# Patient Record
Sex: FEMALE | Race: BLACK OR AFRICAN AMERICAN | Employment: OTHER | URBAN - METROPOLITAN AREA
[De-identification: names, ages, dates, MRNs, and addresses within clinical notes are randomized per-mention and may not be internally consistent; named-entity substitution may affect disease eponyms.]

---

## 2017-02-14 ENCOUNTER — OFFICE VISIT (OUTPATIENT)
Dept: INTERNAL MEDICINE CLINIC | Age: 62
End: 2017-02-14

## 2017-02-14 VITALS
HEIGHT: 63 IN | RESPIRATION RATE: 16 BRPM | HEART RATE: 69 BPM | OXYGEN SATURATION: 98 % | BODY MASS INDEX: 25.12 KG/M2 | DIASTOLIC BLOOD PRESSURE: 78 MMHG | WEIGHT: 141.8 LBS | SYSTOLIC BLOOD PRESSURE: 120 MMHG | TEMPERATURE: 98.7 F

## 2017-02-14 DIAGNOSIS — D50.9 IRON DEFICIENCY ANEMIA, UNSPECIFIED IRON DEFICIENCY ANEMIA TYPE: ICD-10-CM

## 2017-02-14 DIAGNOSIS — E11.9 TYPE 2 DIABETES MELLITUS WITHOUT COMPLICATION, WITHOUT LONG-TERM CURRENT USE OF INSULIN (HCC): ICD-10-CM

## 2017-02-14 DIAGNOSIS — R06.02 SOB (SHORTNESS OF BREATH): ICD-10-CM

## 2017-02-14 DIAGNOSIS — R05.9 COUGH: ICD-10-CM

## 2017-02-14 DIAGNOSIS — Z00.00 ROUTINE GENERAL MEDICAL EXAMINATION AT A HEALTH CARE FACILITY: Primary | ICD-10-CM

## 2017-02-14 DIAGNOSIS — Z11.59 NEED FOR HEPATITIS C SCREENING TEST: ICD-10-CM

## 2017-02-14 NOTE — PROGRESS NOTES
Subjective:   64 y.o. female for Well Woman Check. Her gyne and breast care is done elsewhere by her Ob-Gyne physician. UTD with pap in July 2016 with Dr. Shaji Stewart. Patient Active Problem List    Diagnosis Date Noted    Type 2 diabetes mellitus without complication ([facility] 47/56/4874    Migraine headache 12/14/2012    PUD (peptic ulcer disease) 09/21/2012    Diabetes mellitus ([facility] 03/28/2012    Allergic rhinitis 03/28/2012     Current Outpatient Prescriptions   Medication Sig Dispense Refill    rizatriptan (MAXALT) 10 mg tablet Take 1 Tab by mouth once as needed. May repeat in 2 hours if needed 9 Tab 5    metFORMIN (GLUCOPHAGE) 500 mg tablet Take 2 Tabs by mouth two (2) times daily (with meals). 360 Tab 3    ACCU-CHEK KALYAN PLUS TEST STRP strip USE TO CHECK BLOOD SUGAR DAILY 100 Strip 2    fluticasone (FLONASE) 50 mcg/actuation nasal spray 2 Sprays by Both Nostrils route daily. 1 Bottle 11    Rlpckfdr-Yhxz-Fhgdbo-Hyalur Ac 270-458-13-2 mg Cap Take  by mouth.  MULTIVITAMIN PO Take  by mouth.  aspirin delayed-release 81 mg tablet Take  by mouth daily.        Allergies   Allergen Reactions    Codeine Unknown (comments)     Past Medical History   Diagnosis Date    Depression      anxiety    Diabetes ([facility]      Type II    Headache(784.0)      migraines    Hx of colonic polyps     PUD (peptic ulcer disease)      Past Surgical History   Procedure Laterality Date    Hx heent       T&A    Endoscopy, colon, diagnostic  3/4/11     (Preeti)     Family History   Problem Relation Age of Onset    Cancer Father      lung (+tob)    Coronary Artery Disease Father     Cancer Mother      vaginal/cervical    SLE Mother     Coronary Artery Disease Sister      Social History   Substance Use Topics    Smoking status: Former Smoker     Packs/day: 20.00     Types: Cigarettes    Smokeless tobacco: Never Used    Alcohol use No        DM labs through Dr. Pool Carlson - results from 1/2016 reviewed with Pt.       Specific concerns today:   Seeing Dr. Nubia Albrecht for diabetes. Last A1C was 1/2017 and was 6.4. Continues with chest pain - improved from last visit but now with a pain in upper mid chest.  Sometimes feels more like a shortness of breath. Will last for a couple of days and then resolved. No wheezing, occ cough. GERD symptoms have improved post 14 days of prilosec. Had stress ECHO in November which was normal.  Previous smoker, smoked from mid 70's to 1994, approx 1 ppd. No related to eating, mornings or exertion. .    Blood pressure has not been elevated at previous visits or other MD appointments. Review of Systems  Constitutional: negative  Eyes: negative except for contacts/glasses and utd with eye exam  Ears, nose, mouth, throat, and face: negative except for still with some pain and congestion in left ear  Respiratory: negative except for cough  Cardiovascular: negative except for chest pain  Gastrointestinal: negative except for swallowing issues - GI flet was in back of throat and not an esophagus issue, recommended ENT referral.  Baked chicken and chips cause most issues  Genitourinary:negative  Integument/breast: negative  Hematologic/lymphatic: negative  Musculoskeletal:negative except for left hip and right knee pain  Neurological: negative except for headaches unchanged. Behavioral/Psych: negative  Endocrine: negative  Allergic/Immunologic: negative    Objective:   Blood pressure 120/78, pulse 69, temperature 98.7 °F (37.1 °C), temperature source Oral, resp. rate 16, height 5' 3\" (1.6 m), weight 141 lb 12.8 oz (64.3 kg), SpO2 98 %.    Physical Examination:   General appearance - alert, well appearing, and in no distress and oriented to person, place, and time  Mental status - alert, oriented to person, place, and time, normal mood, behavior, speech, dress, motor activity, and thought processes  Eyes - pupils equal and reactive, extraocular eye movements intact, funduscopic exam normal, discs flat and sharp  Ears - bilateral TM's and external ear canals normal  Nose - normal and patent, no erythema, discharge or polyps  Mouth - mucous membranes moist, pharynx normal without lesions  Neck - supple, no significant adenopathy, carotids upstroke normal bilaterally, no bruits, thyroid exam: thyroid is normal in size without nodules or tenderness  Lymphatics - no palpable lymphadenopathy, no hepatosplenomegaly  Chest - clear to auscultation, no wheezes, rales or rhonchi, symmetric air entry  Heart - normal rate, regular rhythm, normal S1, S2, no murmurs, rubs, clicks or gallops  Abdomen - soft, nontender, nondistended, no masses or organomegaly  bowel sounds normal  Breasts - breasts appear normal, no suspicious masses, no skin or nipple changes or axillary nodes  Back exam - full range of motion, no tenderness, palpable spasm or pain on motion  Neurological - alert, oriented, normal speech, no focal findings or movement disorder noted  Musculoskeletal - no joint tenderness, deformity or swelling  Extremities - peripheral pulses normal, no pedal edema, no clubbing or cyanosis  Skin - normal coloration and turgor, no rashes, no suspicious skin lesions noted     Assessment/Plan:   63yo female  DM - controlled, cont same. Foot exam complete today  Hx anemia - last blood donation borderline iron levels for eligability. Will repeat   Left chest pain - nonscardia in origin. Check CBC and pft's. HM- UTD in screeening tests.       call if any problems  Orders Placed This Encounter    XR CHEST PA LAT    HEPATITIS C AB    CBC W/O DIFF    IRON PROFILE     Follow-up Disposition: Not on File

## 2017-02-14 NOTE — MR AVS SNAPSHOT
Visit Information Date & Time Provider Department Dept. Phone Encounter #  
 2/14/2017 10:40 AM Angela Pandya MD [facility] [phone] [id number] Upcoming Health Maintenance Date Due Hepatitis C Screening 1955 EYE EXAM RETINAL OR DILATED Q1 11/4/2015 FOOT EXAM Q1 7/20/2016 HEMOGLOBIN A1C Q6M 10/22/2016 PAP AKA CERVICAL CYTOLOGY 1/1/2017 MICROALBUMIN Q1 9/15/2017 LIPID PANEL Q1 9/15/2017 DTaP/Tdap/Td series (2 - Td) 11/5/2018 BREAST CANCER SCRN MAMMOGRAM 12/14/2018 COLONOSCOPY 7/8/2021 Allergies as of 2/14/2017  Review Complete On: 2/14/2017 By: Angela Pandya MD  
  
 Severity Noted Reaction Type Reactions Codeine  12/19/2011    Unknown (comments) Current Immunizations  Reviewed on 2/14/2017 Name Date Influenza Vaccine 11/1/2016, 11/5/2014, 11/29/2013 Influenza Vaccine Whole 10/24/2011, 11/1/2008 Pneumococcal Vaccine (Unspecified Type) 8/17/2006 TDAP Vaccine 11/5/2008 Zoster Vaccine, Live 5/13/2016 Reviewed by Angela Pandya MD on 2/14/2017 at 11:39 AM  
You Were Diagnosed With   
  
 Codes Comments Routine general medical examination at a health care facility    -  Primary ICD-10-CM: Z00.00 ICD-9-CM: V70.0 Need for hepatitis C screening test     ICD-10-CM: Z11.59 
ICD-9-CM: V73.89   
 SOB (shortness of breath)     ICD-10-CM: R06.02 
ICD-9-CM: 786.05 Cough     ICD-10-CM: R05 ICD-9-CM: 168. 2 Type 2 diabetes mellitus without complication, without long-term current use of insulin (HCC)     ICD-10-CM: E11.9 ICD-9-CM: 250.00 Iron deficiency anemia, unspecified iron deficiency anemia type     ICD-10-CM: D50.9 ICD-9-CM: 280.9 Vitals BP Pulse Temp Resp Height(growth percentile) Weight(growth percentile) 120/78 69 98.7 °F (37.1 °C) (Oral) 16 5' 3\" (1.6 m) 141 lb 12.8 oz (64.3 kg) SpO2 BMI OB Status Smoking Status 98% 25.12 kg/m2 Postmenopausal Former Smoker Vitals History BMI and BSA Data Body Mass Index Body Surface Area  
 25.12 kg/m 2 1.69 m 2 Preferred Pharmacy Pharmacy Name Phone [facility] [address] , [address] [place] [address] [phone] Your Updated Medication List  
  
   
This list is accurate as of: 2/14/17 12:04 PM.  Always use your most recent med list.  
  
  
  
  
 ACCU-CHEK KALYAN PLUS TEST STRP strip Generic drug:  glucose blood VI test strips USE TO CHECK BLOOD SUGAR DAILY  
  
 aspirin delayed-release 81 mg tablet Take  by mouth daily. fluticasone 50 mcg/actuation nasal spray Commonly known as:  Caffie Euler 2 Sprays by Both Nostrils route daily. Nplejzsh-Yqem-Mdvjsw-Hyalur Ac 600-690-67-2 mg Cap Take  by mouth.  
  
 metFORMIN 500 mg tablet Commonly known as:  GLUCOPHAGE Take 2 Tabs by mouth two (2) times daily (with meals). MULTIVITAMIN PO Take  by mouth.  
  
 rizatriptan 10 mg tablet Commonly known as:  Ok Spells Take 1 Tab by mouth once as needed. May repeat in 2 hours if needed We Performed the Following CBC W/O DIFF [93071 CPT(R)] HEPATITIS C AB [89931 CPT(R)] HM DIABETES FOOT EXAM [HM7 Custom] IRON PROFILE [id number] CPT(R)] To-Do List   
 02/14/2017 Imaging:  XR CHEST PA LAT   
  
 02/20/2017 PFT:  PULMONARY FUNCTION TEST Please provide this summary of care documentation to your next provider. Your primary care clinician is listed as ERIC LUO. If you have any questions after today's visit, please call [phone].

## 2017-02-15 LAB
ERYTHROCYTE [DISTWIDTH] IN BLOOD BY AUTOMATED COUNT: 14.4 % (ref 12.3–15.4)
HCT VFR BLD AUTO: 36 % (ref 34–46.6)
HCV AB S/CO SERPL IA: <0.1 S/CO RATIO (ref 0–0.9)
HGB BLD-MCNC: 11.6 G/DL (ref 11.1–15.9)
IRON SATN MFR SERPL: 19 % (ref 15–55)
IRON SERPL-MCNC: 65 UG/DL (ref 27–139)
MCH RBC QN AUTO: 28.5 PG (ref 26.6–33)
MCHC RBC AUTO-ENTMCNC: 32.2 G/DL (ref 31.5–35.7)
MCV RBC AUTO: 89 FL (ref 79–97)
PLATELET # BLD AUTO: 327 X10E3/UL (ref 150–379)
RBC # BLD AUTO: 4.07 X10E6/UL (ref 3.77–5.28)
TIBC SERPL-MCNC: 336 UG/DL (ref 250–450)
UIBC SERPL-MCNC: 271 UG/DL (ref 118–369)
WBC # BLD AUTO: 6.3 X10E3/UL (ref 3.4–10.8)

## 2017-03-01 ENCOUNTER — HOSPITAL ENCOUNTER (OUTPATIENT)
Dept: GENERAL RADIOLOGY | Age: 62
Discharge: HOME | End: 2017-03-01
Attending: INTERNAL MEDICINE
Payer: COMMERCIAL

## 2017-03-01 ENCOUNTER — HOSPITAL ENCOUNTER (OUTPATIENT)
Dept: PULMONOLOGY | Age: 62
Discharge: HOME | End: 2017-03-01
Attending: INTERNAL MEDICINE
Payer: COMMERCIAL

## 2017-03-01 DIAGNOSIS — R05.9 COUGH: ICD-10-CM

## 2017-03-01 DIAGNOSIS — R06.02 SOB (SHORTNESS OF BREATH): ICD-10-CM

## 2017-03-01 PROCEDURE — 71020 XR CHEST PA LAT: CPT

## 2017-03-01 PROCEDURE — 94010 BREATHING CAPACITY TEST: CPT

## 2017-03-02 NOTE — PROCEDURES
[address]   [address], [address]   PULMONARY FUNCTION       Name:  Sanam Black   MR#:  [id number]   :  1955   Account #:  [de-identified]    Date of Procedure:  2017   Date of Adm:  2017       REQUESTING PRACTITIONER: Claudia Ferguson MD     INDICATIONS: Shortness of breath. Spirometry and flow loops are normal to be normal.     IMPRESSION: Normal study.         MD KELSEY Orozco / JORGE ALBERTO   D:  2017   09:57   T:  2017   14:57   Job #:  [id number]

## 2017-06-27 ENCOUNTER — HOSPITAL ENCOUNTER (OUTPATIENT)
Dept: GENERAL RADIOLOGY | Age: 62
Discharge: HOME | End: 2017-06-27
Attending: OTOLARYNGOLOGY
Payer: COMMERCIAL

## 2017-06-27 DIAGNOSIS — K21.9 ESOPHAGEAL REFLUX: ICD-10-CM

## 2017-06-27 PROCEDURE — 74220 X-RAY XM ESOPHAGUS 1CNTRST: CPT

## 2017-12-15 ENCOUNTER — HOSPITAL ENCOUNTER (OUTPATIENT)
Dept: MAMMOGRAPHY | Age: 62
Discharge: HOME | End: 2017-12-15
Attending: INTERNAL MEDICINE
Payer: COMMERCIAL

## 2017-12-15 DIAGNOSIS — Z12.39 BREAST SCREENING: ICD-10-CM

## 2017-12-15 PROCEDURE — 77067 SCR MAMMO BI INCL CAD: CPT

## 2018-01-29 ENCOUNTER — HOSPITAL ENCOUNTER (OUTPATIENT)
Dept: MRI IMAGING | Age: 63
Discharge: HOME | End: 2018-01-29
Attending: ORTHOPAEDIC SURGERY
Payer: COMMERCIAL

## 2018-01-29 DIAGNOSIS — M25.552 LEFT HIP PAIN: ICD-10-CM

## 2018-01-29 PROCEDURE — 73721 MRI JNT OF LWR EXTRE W/O DYE: CPT

## 2018-03-16 ENCOUNTER — OFFICE VISIT (OUTPATIENT)
Dept: INTERNAL MEDICINE CLINIC | Age: 63
End: 2018-03-16

## 2018-03-16 VITALS
WEIGHT: 147 LBS | SYSTOLIC BLOOD PRESSURE: 123 MMHG | HEART RATE: 80 BPM | RESPIRATION RATE: 20 BRPM | OXYGEN SATURATION: 98 % | TEMPERATURE: 98.2 F | DIASTOLIC BLOOD PRESSURE: 72 MMHG | HEIGHT: 63 IN | BODY MASS INDEX: 26.05 KG/M2

## 2018-03-16 DIAGNOSIS — E11.9 TYPE 2 DIABETES MELLITUS WITHOUT COMPLICATION, WITHOUT LONG-TERM CURRENT USE OF INSULIN (HCC): Primary | ICD-10-CM

## 2018-03-16 DIAGNOSIS — D64.9 ANEMIA, UNSPECIFIED TYPE: ICD-10-CM

## 2018-03-16 DIAGNOSIS — G43.009 MIGRAINE WITHOUT AURA AND WITHOUT STATUS MIGRAINOSUS, NOT INTRACTABLE: ICD-10-CM

## 2018-03-16 DIAGNOSIS — M16.12 PRIMARY OSTEOARTHRITIS OF LEFT HIP: ICD-10-CM

## 2018-03-16 RX ORDER — RIZATRIPTAN BENZOATE 10 MG/1
10 TABLET ORAL
Qty: 9 TAB | Refills: 5 | Status: SHIPPED | OUTPATIENT
Start: 2018-03-16 | End: 2019-08-09 | Stop reason: SDUPTHER

## 2018-03-16 RX ORDER — DICLOFENAC SODIUM 75 MG/1
TABLET, DELAYED RELEASE ORAL
COMMUNITY
Start: 2018-02-02 | End: 2018-03-22

## 2018-03-16 RX ORDER — METFORMIN HYDROCHLORIDE 500 MG/1
1000 TABLET, EXTENDED RELEASE ORAL 2 TIMES DAILY
COMMUNITY
Start: 2018-01-02 | End: 2018-04-24 | Stop reason: SDUPTHER

## 2018-03-16 NOTE — MR AVS SNAPSHOT
[address] [place] [address] [address] [facility] 
[phone] Patient: Dania Little MRN: [id number] MTS:[birth year] Visit Information Date & Time Provider Department Dept. Phone Encounter #  
 3/16/2018 11:20 AM Sonu Olguin MD [facility] [phone] [id number] Your Appointments 6/26/2018 10:40 AM  
COMPLETE 40 with Sonu Olguin MD  
[facility] Appt Note: [address] [facility] [address]  
[facility] [address] [address] [address] [address] Upcoming Health Maintenance Date Due  
 PAP AKA CERVICAL CYTOLOGY 1/1/2017 HEMOGLOBIN A1C Q6M 7/7/2017 MICROALBUMIN Q1 9/15/2017 LIPID PANEL Q1 9/15/2017 EYE EXAM RETINAL OR DILATED Q1 11/14/2017 FOOT EXAM Q1 2/14/2018 DTaP/Tdap/Td series (2 - Td) 11/5/2018 BREAST CANCER SCRN MAMMOGRAM 12/15/2019 COLONOSCOPY 7/8/2021 Allergies as of 3/16/2018  Review Complete On: 3/16/2018 By: Sonu Olguin MD  
  
 Severity Noted Reaction Type Reactions Codeine  12/19/2011    Unknown (comments) Current Immunizations  Reviewed on 2/14/2017 Name Date Influenza Vaccine 11/1/2016, 11/5/2014, 11/29/2013 Influenza Vaccine Whole 10/24/2011, 11/1/2008 TDAP Vaccine 11/5/2008 ZZZ-RETIRED (DO NOT USE) Pneumococcal Vaccine (Unspecified Type) 8/17/2006 Zoster Vaccine, Live 5/13/2016 Not reviewed this visit You Were Diagnosed With   
  
 Codes Comments Type 2 diabetes mellitus without complication, without long-term current use of insulin (HCC)    -  Primary ICD-10-CM: E11.9 ICD-9-CM: 250.00 Migraine without aura and without status migrainosus, not intractable     ICD-10-CM: C50.158 ICD-9-CM: 346.10 Anemia, unspecified type     ICD-10-CM: D64.9 ICD-9-CM: 569. 9 Vitals BP Pulse Temp Resp Height(growth percentile) Weight(growth percentile) 123/72 80 98.2 °F (36.8 °C) (Oral) 20 5' 3\" (1.6 m) 147 lb (66.7 kg) SpO2 BMI OB Status Smoking Status 98% 26.04 kg/m2 Postmenopausal Former Smoker Vitals History BMI and BSA Data Body Mass Index Body Surface Area 26.04 kg/m 2 1.72 m 2 Preferred Pharmacy Pharmacy Name Phone [place] [address] [address] [facility], [address] [place] [address] [phone] Your Updated Medication List  
  
   
This list is accurate as of 3/16/18 12:04 PM.  Always use your most recent med list.  
  
  
  
  
 ACCU-CHEK KALYAN PLUS TEST STRP strip Generic drug:  glucose blood VI test strips USE TO CHECK BLOOD SUGAR DAILY  
  
 aspirin delayed-release 81 mg tablet Take  by mouth daily. diclofenac EC 75 mg EC tablet Commonly known as:  VOLTAREN  
  
 fluticasone 50 mcg/actuation nasal spray Commonly known as:  Matthews Moots 2 Sprays by Both Nostrils route daily. Hoedfwst-Jmbw-Udpboe-Hyalur Ac 617-072-45-2 mg Cap Take  by mouth.  
  
 metFORMIN  mg tablet Commonly known as:  GLUCOPHAGE XR  
1,000 mg two (2) times a day. MULTIPLE VITAMINS 55 PLUS PO Take  by mouth.  
  
 rizatriptan 10 mg tablet Commonly known as:  Stana Adams Take 1 Tab by mouth once as needed. May repeat in 2 hours if needed Prescriptions Sent to Pharmacy Refills  
 rizatriptan (MAXALT) 10 mg tablet 5 Sig: Take 1 Tab by mouth once as needed. May repeat in 2 hours if needed Class: Normal  
 Pharmacy: [facility] [address] [facility], [address] [facility] Ph #: [phone] Route: Oral  
  
We Performed the Following CBC W/O DIFF [39122 CPT(R)] HEMOGLOBIN A1C WITH EAG [64390 CPT(R)] LIPID PANEL [24011 CPT(R)] METABOLIC PANEL, COMPREHENSIVE [60280 CPT(R)] MICROALBUMIN, UR, RAND W/ MICROALB/CREAT RATIO [id number] CPT(R)] URINALYSIS W/ RFLX MICROSCOPIC [69993 CPT(R)] To-Do List   
 2018 9:30 AM  
  Appointment with [facility] PAT CLASSROOM at [address] [facility] ([phone])  
  
 2018 11:30 AM  
  Appointment with [facility] PAT EXAM RM 2 at [address] [facility] ([phone]) Patient Instructions MyChart Activation Thank you for requesting access to [employer]. Please follow the instructions below to securely access and download your online medical record. [employer] allows you to send messages to your doctor, view your test results, renew your prescriptions, schedule appointments, and more. How Do I Sign Up? 1. In your internet browser, go to [email or web address] 
2. Click on the First Time User? Click Here link in the Sign In box. You will be redirect to the New Member Sign Up page. 3. Enter your [employer] Access Code exactly as it appears below. You will not need to use this code after youve completed the sign-up process. If you do not sign up before the expiration date, you must request a new code. [employer] Access Code: Activation code not generated Current [employer] Status: Patient Declined (This is the date your [employer] access code will ) 4. Enter the last four digits of your Social Security Number (xxxx) and Date of Birth (mm/dd/yyyy) as indicated and click Submit. You will be taken to the next sign-up page. 5. Create a [employer] ID. This will be your [employer] login ID and cannot be changed, so think of one that is secure and easy to remember. 6. Create a [employer] password. You can change your password at any time. 7. Enter your Password Reset Question and Answer. This can be used at a later time if you forget your password. 8. Enter your e-mail address. You will receive e-mail notification when new information is available in [address] 9. Click Sign Up. You can now view and download portions of your medical record. 10. Click the Download Summary menu link to download a portable copy of your medical information. Additional Information If you have questions, please visit the Frequently Asked Questions section of the [employer] website at [email or web address] Remember, [employer] is NOT to be used for urgent needs. For medical emergencies, dial 911. Please provide this summary of care documentation to your next provider. Your primary care clinician is listed as ERIC LUO. If you have any questions after today's visit, please call [phone].

## 2018-03-16 NOTE — PROGRESS NOTES
Reviewed record in preparation for visit and have obtained necessary documentation. Identified pt with two pt identifiers(name and ). Health Maintenance Due   Topic    PAP AKA CERVICAL CYTOLOGY     HEMOGLOBIN A1C Q6M     Influenza Age 5 to Adult     MICROALBUMIN Q1     LIPID PANEL Q1     EYE EXAM RETINAL OR DILATED Q1     FOOT EXAM Q1          No chief complaint on file. Wt Readings from Last 3 Encounters:   18 147 lb (66.7 kg)   17 141 lb 12.8 oz (64.3 kg)   11/10/16 141 lb (64 kg)     Temp Readings from Last 3 Encounters:   18 98.2 °F (36.8 °C) (Oral)   17 98.7 °F (37.1 °C) (Oral)   11/10/16 98.4 °F (36.9 °C) (Oral)     BP Readings from Last 3 Encounters:   17 120/78   11/10/16 130/75   16 129/75     Pulse Readings from Last 3 Encounters:   17 69   11/10/16 74   16 70           Learning Assessment:  :     Learning Assessment 3/16/2018 2015 2014   PRIMARY LEARNER Patient Patient Patient   HIGHEST LEVEL OF EDUCATION - PRIMARY LEARNER  - SOME COLLEGE SOME COLLEGE   BARRIERS PRIMARY LEARNER - NONE NONE   CO-LEARNER CAREGIVER - No No   PRIMARY LANGUAGE ENGLISH ENGLISH ENGLISH    NEED - No No   LEARNER PREFERENCE PRIMARY DEMONSTRATION READING READING   LEARNING SPECIAL TOPICS - - no   ANSWERED BY self patient patient   RELATIONSHIP SELF SELF SELF       Depression Screening:  :     PHQ over the last two weeks 3/16/2018   Little interest or pleasure in doing things Not at all   Feeling down, depressed or hopeless Not at all   Total Score PHQ 2 0       Fall Risk Assessment:  :     No flowsheet data found. Abuse Screening:  :     Abuse Screening Questionnaire 3/16/2018 2015 2014   Do you ever feel afraid of your partner? N N N   Are you in a relationship with someone who physically or mentally threatens you? N N N   Is it safe for you to go home?  Patrick Velasco       Coordination of Care Questionnaire:  :     1) Have you been to an emergency room, urgent care clinic since your last visit? no   Hospitalized since your last visit? no             2) Have you seen or consulted any other health care providers outside of [address] [place] [address] since your last visit? no  (Include any pap smears or colon screenings in this section.)    3) Do you have an Advance Directive on file? no    4) Are you interested in receiving information on Advance Directives? NO      Patient is accompanied by self I have received verbal consent from Isak Herndon to discuss any/all medical information while they are present in the room.

## 2018-03-16 NOTE — PATIENT INSTRUCTIONS
[employer] Activation    Thank you for requesting access to [employer]. Please follow the instructions below to securely access and download your online medical record. [facility] allows you to send messages to your doctor, view your test results, renew your prescriptions, schedule appointments, and more. How Do I Sign Up? 1. In your internet browser, go to [email or web address]  2. Click on the First Time User? Click Here link in the Sign In box. You will be redirect to the New Member Sign Up page. 3. Enter your [employer] Access Code exactly as it appears below. You will not need to use this code after youve completed the sign-up process. If you do not sign up before the expiration date, you must request a new code. [facility] Access Code: Activation code not generated  Current [facility] Status: Patient Declined (This is the date your [employer] access code will )    4. Enter the last four digits of your Social Security Number (xxxx) and Date of Birth (mm/dd/yyyy) as indicated and click Submit. You will be taken to the next sign-up page. 5. Create a [facility] ID. This will be your [employer] login ID and cannot be changed, so think of one that is secure and easy to remember. 6. Create a [facility] password. You can change your password at any time. 7. Enter your Password Reset Question and Answer. This can be used at a later time if you forget your password. 8. Enter your e-mail address. You will receive e-mail notification when new information is available in [address] 9. Click Sign Up. You can now view and download portions of your medical record. 10. Click the Download Summary menu link to download a portable copy of your medical information. Additional Information    If you have questions, please visit the Frequently Asked Questions section of the [employer] website at [email or web address] Remember, [employer] is NOT to be used for urgent needs. For medical emergencies, dial 911.

## 2018-03-16 NOTE — PROGRESS NOTES
Subjective:     Nisha Wong is a 58 y.o. female seen for follow up of diabetes. She also has left hip pain . Diabetic Review of Systems - medication compliance: compliant all of the time, diabetic diet compliance: compliant most of the time, home glucose monitoring: is not performed, further diabetic ROS: no polyuria or polydipsia, no chest pain, dyspnea or TIA's, no numbness, tingling or pain in extremities, no unusual visual symptoms, last eye exam approximately 4 months ago. Sees Dr. Amelia Major, last seen 2017, has follow up in . Last A1C was 6.7    Other symptoms and concerns:   Left hip replacement  at [facility] with Dr. Shakira Eden. Now with pain in right hip. Has not been exercising secondary to hip   Has tried to give blood but hgb too low. Denies BRBPR or melena. Awakening in am with headaches - mild. Either posterior or left sided. Increased allergy symptoms, icnreased stress. Migraine medication has . Current Outpatient Prescriptions   Medication Sig Dispense Refill    metFORMIN ER (GLUCOPHAGE XR) 500 mg tablet 1,000 mg two (2) times a day.  diclofenac EC (VOLTAREN) 75 mg EC tablet       MULTIVITAMIN WITH MINERALS (MULTIPLE VITAMINS 55 PLUS PO) Take  by mouth.  rizatriptan (MAXALT) 10 mg tablet Take 1 Tab by mouth once as needed. May repeat in 2 hours if needed 9 Tab 5    ACCU-CHEK KALYAN PLUS TEST STRP strip USE TO CHECK BLOOD SUGAR DAILY 100 Strip 2    Skasespq-Fbia-Rsgvts-Hyalur Ac 440-066-13-2 mg Cap Take  by mouth.  aspirin delayed-release 81 mg tablet Take  by mouth daily.  fluticasone (FLONASE) 50 mcg/actuation nasal spray 2 Sprays by Both Nostrils route daily.  1 Bottle 11        Lab Results   Component Value Date/Time    Hemoglobin A1c 7.4 (H) 2015 09:37 AM    Hemoglobin A1c 7.4 (H) 2015 08:00 AM    Hemoglobin A1c 7.0 (H) 2014 08:54 AM    Hemoglobin A1c, External 7.1 2016    Hemoglobin A1c, External 6.8 2015 Glucose 116 (H) 05/28/2015 09:37 AM    Microalb/Creat ratio (ug/mg creat.) 4.5 03/04/2015 08:00 AM    LDL, calculated 92 03/04/2015 08:00 AM    Creatinine 0.93 05/28/2015 09:37 AM      Lab Results   Component Value Date/Time    Cholesterol, total 197 03/04/2015 08:00 AM    HDL Cholesterol 86 03/04/2015 08:00 AM    LDL, calculated 92 03/04/2015 08:00 AM    LDL-C, External 88 09/11/2015    Triglyceride 96 03/04/2015 08:00 AM     Lab Results   Component Value Date/Time    ALT (SGPT) 12 03/04/2015 08:00 AM    AST (SGOT) 16 03/04/2015 08:00 AM    Alk. phosphatase 74 03/04/2015 08:00 AM    Bilirubin, total 0.3 03/04/2015 08:00 AM    Albumin 4.8 03/04/2015 08:00 AM    Protein, total 7.2 03/04/2015 08:00 AM    PLATELET 664 45/70/4933 12:17 PM       Lab Results   Component Value Date/Time    GFR est non-AA 67 05/28/2015 09:37 AM    GFR est AA 78 05/28/2015 09:37 AM    Creatinine 0.93 05/28/2015 09:37 AM    BUN 15 05/28/2015 09:37 AM    Sodium 141 05/28/2015 09:37 AM    Potassium 4.3 05/28/2015 09:37 AM    Chloride 103 05/28/2015 09:37 AM    CO2 26 05/28/2015 09:37 AM        Review of Systems  Pertinent items are noted in HPI. Objective:     Visit Vitals    /72    Pulse 80    Temp 98.2 °F (36.8 °C) (Oral)    Resp 20    Ht 5' 3\" (1.6 m)    Wt 147 lb (66.7 kg)    SpO2 98%    BMI 26.04 kg/m2     Appearance: alert, well appearing, and in no distress and oriented to person, place, and time. Exam:   NECK: supple, no lad, no bruit, no tm  LUNGS: cta bilat  CV rrr, no m/g/r  ABD: soft, nt, nd, nabs  EXT: no c/c/e    Assessment/Plan:     diabetes well controlled. Diabetic issues reviewed with her: check labs, continue diet and restart exercise after THR. Sandra Singh Continue same medication    Left hip end stage DJD - upcoming hip replacement. Hx anemia with recent low hemoglobin - repeat cbc. Headaches - some appear to be migraines, left sided. Will renew Maxalt. Some appear to be tension. Advil prn.   May improve post surgery. Anxious re upcoming surgery - pt reassured. Orders Placed This Encounter    METABOLIC PANEL, COMPREHENSIVE    LIPID PANEL    URINALYSIS W/ RFLX MICROSCOPIC    MICROALBUMIN, UR, RAND W/ MICROALB/CREAT RATIO    HEMOGLOBIN A1C WITH EAG    CBC W/O DIFF    metFORMIN ER (GLUCOPHAGE XR) 500 mg tablet    diclofenac EC (VOLTAREN) 75 mg EC tablet    MULTIVITAMIN WITH MINERALS (MULTIPLE VITAMINS 55 PLUS PO)    rizatriptan (MAXALT) 10 mg tablet     Follow-up Disposition:  Return in about 6 months (around 9/16/2018) for diabetes.

## 2018-03-17 LAB
ALBUMIN SERPL-MCNC: 4.5 G/DL (ref 3.6–4.8)
ALBUMIN/CREAT UR: 11.3 MG/G CREAT (ref 0–30)
ALBUMIN/GLOB SERPL: 1.8 {RATIO} (ref 1.2–2.2)
ALP SERPL-CCNC: 73 IU/L (ref 39–117)
ALT SERPL-CCNC: 11 IU/L (ref 0–32)
APPEARANCE UR: CLEAR
AST SERPL-CCNC: 14 IU/L (ref 0–40)
BILIRUB SERPL-MCNC: 0.5 MG/DL (ref 0–1.2)
BILIRUB UR QL STRIP: NEGATIVE
BUN SERPL-MCNC: 13 MG/DL (ref 8–27)
BUN/CREAT SERPL: 15 (ref 12–28)
CALCIUM SERPL-MCNC: 9.9 MG/DL (ref 8.7–10.3)
CHLORIDE SERPL-SCNC: 101 MMOL/L (ref 96–106)
CHOLEST SERPL-MCNC: 172 MG/DL (ref 100–199)
CO2 SERPL-SCNC: 23 MMOL/L (ref 18–29)
COLOR UR: YELLOW
CREAT SERPL-MCNC: 0.85 MG/DL (ref 0.57–1)
CREAT UR-MCNC: 95.7 MG/DL
ERYTHROCYTE [DISTWIDTH] IN BLOOD BY AUTOMATED COUNT: 14.6 % (ref 12.3–15.4)
EST. AVERAGE GLUCOSE BLD GHB EST-MCNC: 146 MG/DL
GFR SERPLBLD CREATININE-BSD FMLA CKD-EPI: 74 ML/MIN/1.73
GFR SERPLBLD CREATININE-BSD FMLA CKD-EPI: 85 ML/MIN/1.73
GLOBULIN SER CALC-MCNC: 2.5 G/DL (ref 1.5–4.5)
GLUCOSE SERPL-MCNC: 95 MG/DL (ref 65–99)
GLUCOSE UR QL: NEGATIVE
HBA1C MFR BLD: 6.7 % (ref 4.8–5.6)
HCT VFR BLD AUTO: 36.5 % (ref 34–46.6)
HDLC SERPL-MCNC: 75 MG/DL
HGB BLD-MCNC: 11.9 G/DL (ref 11.1–15.9)
HGB UR QL STRIP: NEGATIVE
INTERPRETATION, 910389: NORMAL
KETONES UR QL STRIP: NEGATIVE
LDLC SERPL CALC-MCNC: 74 MG/DL (ref 0–99)
LEUKOCYTE ESTERASE UR QL STRIP: NEGATIVE
Lab: NORMAL
MCH RBC QN AUTO: 28.6 PG (ref 26.6–33)
MCHC RBC AUTO-ENTMCNC: 32.6 G/DL (ref 31.5–35.7)
MCV RBC AUTO: 88 FL (ref 79–97)
MICRO URNS: NORMAL
MICROALBUMIN UR-MCNC: 10.8 UG/ML
NITRITE UR QL STRIP: NEGATIVE
PH UR STRIP: 5.5 [PH] (ref 5–7.5)
PLATELET # BLD AUTO: 339 X10E3/UL (ref 150–379)
POTASSIUM SERPL-SCNC: 3.9 MMOL/L (ref 3.5–5.2)
PROT SERPL-MCNC: 7 G/DL (ref 6–8.5)
PROT UR QL STRIP: NEGATIVE
RBC # BLD AUTO: 4.16 X10E6/UL (ref 3.77–5.28)
SODIUM SERPL-SCNC: 143 MMOL/L (ref 134–144)
SP GR UR: 1.02 (ref 1–1.03)
TRIGL SERPL-MCNC: 114 MG/DL (ref 0–149)
UROBILINOGEN UR STRIP-MCNC: 0.2 MG/DL (ref 0.2–1)
VLDLC SERPL CALC-MCNC: 23 MG/DL (ref 5–40)
WBC # BLD AUTO: 6.9 X10E3/UL (ref 3.4–10.8)

## 2018-03-22 ENCOUNTER — HOSPITAL ENCOUNTER (OUTPATIENT)
Dept: PREADMISSION TESTING | Age: 63
Discharge: HOME | End: 2018-03-22
Payer: COMMERCIAL

## 2018-03-22 VITALS
TEMPERATURE: 98.5 F | RESPIRATION RATE: 18 BRPM | HEART RATE: 71 BPM | OXYGEN SATURATION: 100 % | DIASTOLIC BLOOD PRESSURE: 81 MMHG | BODY MASS INDEX: 26.27 KG/M2 | HEIGHT: 63 IN | WEIGHT: 148.25 LBS | SYSTOLIC BLOOD PRESSURE: 132 MMHG

## 2018-03-22 LAB
ABO + RH BLD: NORMAL
ATRIAL RATE: 72 BPM
BLOOD GROUP ANTIBODIES SERPL: NORMAL
CALCULATED P AXIS, ECG09: 57 DEGREES
CALCULATED R AXIS, ECG10: 15 DEGREES
CALCULATED T AXIS, ECG11: 9 DEGREES
DIAGNOSIS, 93000: NORMAL
INR PPP: 0.9 (ref 0.9–1.1)
P-R INTERVAL, ECG05: 182 MS
PROTHROMBIN TIME: 9.7 SEC (ref 9–11.1)
Q-T INTERVAL, ECG07: 370 MS
QRS DURATION, ECG06: 70 MS
QTC CALCULATION (BEZET), ECG08: 405 MS
SPECIMEN EXP DATE BLD: NORMAL
VENTRICULAR RATE, ECG03: 72 BPM

## 2018-03-22 PROCEDURE — 86900 BLOOD TYPING SEROLOGIC ABO: CPT | Performed by: ORTHOPAEDIC SURGERY

## 2018-03-22 PROCEDURE — 85610 PROTHROMBIN TIME: CPT | Performed by: ORTHOPAEDIC SURGERY

## 2018-03-22 PROCEDURE — 93005 ELECTROCARDIOGRAM TRACING: CPT

## 2018-03-23 LAB
BACTERIA SPEC CULT: NORMAL
BACTERIA SPEC CULT: NORMAL
SERVICE CMNT-IMP: NORMAL

## 2018-04-13 NOTE — H&P
Kalina Dorman (MR# [id number])  : 1955        Office Visit     2/15/2018  [facility]   Lynda Gooden MD   Orthopedic Surgery   Primary osteoarthritis of left hip   Dx   Left Hip - Pain   Reason for Visit    Progress notes        PCP: ERIC Dunne MD  There are no active problems to display for this patient.        Subjective:   Chief Complaint: Pain of the Left Hip        HPI: Kalina Dorman is a 58 y.o. female who comes in today for 2nd opinion regarding some left hip and groin pain. She has been symptomatic now for about 6 months. The pain is getting progressively worse. She complains of pain in the left groin radiating into her left thigh. She states that there is a constant dull ache with intermittent severe pain. It significantly impacts her daily activities. She is no longer able to walk for exercise. She is having night pain. She has tried anti-inflammatory agents with minimal relief. She denies any radicular symptoms. She underwent an MRI scan of her left hip confirming moderate to severe osteoarthritis of the left hip with degenerative tearing of the labrum. Hip replacement was recommended.        Objective:          Vitals:     02/15/18 1509   BP: (!) 148/86   Pulse: 79      Height: 5' 3\"   Weight: 145 lb   Body mass index is 25.69 kg/m².     Constitutional:  No acute distress. Well nourished. Well developed. Eyes:  Sclera are nonicteric. Respiratory:  No labored breathing. Cardiovascular:  No marked edema. Skin:  No marked skin ulcers. Neurological:  No marked sensory loss noted. Psychiatric: Alert and oriented x3. Musculoskeletal :  She has decreased range of motion of the left hip with pain on motion. Motion of the left hip reproduces her symptoms. She is nontender over the trochanteric bursa. She has normal range of motion of the right hip. She has negative straight leg raise. She has no apparent atrophy.   She has normal range of motion of bilateral knees without crepitus or effusion. She has a strong pedal pulse. She has no pedal edema. Her leg lengths are equal. Her skin is healthy and intact.        Radiographs:       X-ray Hip Left 2+ Views     Result Date: 2/15/2018  AP Pelvis, Frog Lateral. Notes Impression:  AP pelvis and left lateral hip x-ray show moderate osteoarthritis of the left hip with mild degenerative changes of the right hip.        Assessment:      1. Primary osteoarthritis of left hip          Plan:   I reviewed her plain x-rays as well as her MRI scan which is on the iCAD system. Her plain x-rays show moderate arthritis. The MRI scan confirms focal areas of bone on bone arthritis. We spent a long time discussing treatment options. I explained that I did not have much conservative management to offer her. I am not confident that physical therapy would provide benefit for her. We discussed the option of x-ray guided steroid injections to the left hip however I am not confident this will provide long-lasting relief for her. I discussed hip replacement surgery at length with her including expected outcomes and postop recovery as well as risks and complications specifically DVT, PE, infection, dislocation, leg length discrepancy, nerve injury. After much discussion she would like to think about this. She will let us know if and when she would like to proceed.         Orders Placed This Encounter    X-ray hip left 2+ views      Return if symptoms worsen or fail to improve.      Ene Camacho MD   2/15/2018  6:33 PM      Electronically signed by Ene Camacho MD at 2/15/2018  6:37 PM    Instructions         Return if symptoms worsen or fail to improve.    Additional Documentation     Vitals:    BP  148/86    Pulse 79    Ht 5' 3\"    Wt 145 lb    BMI 25.69 kg/m²    BSA 1.71 m²         More Vitals    Flowsheets:    Custom Formula Data       Encounter Info:    Billing Info,    History,    Allergies,    Detailed Report       Communications OV PCP/Referring Notes sent to Prema Mas MD   Routing History     Recipient Method Sent by Shelton Haddad MD Fax Drew Serrano MD 2/15/2018   Fax: [phone]  Phone: [phone]    Recipient     Recipient Method Sent by Shelton Haddad MD Fax Drew Serrano MD 2/15/2018   Fax: [phone]  Phone: [phone]    Orders Placed         X-ray hip left 2+ views   Daily Orders   Comment  Hide   (720h ago through future)      Last edited by  on  at    Start   Ordered   02/15/18 0000  X-ray hip left 2+ views   Comments: Specimen [id number]: B3     02/15/18 1522   Medication Changes        None      Medication List   Visit Diagnoses         Primary osteoarthritis of left hip      Problem List     Date of Surgery Update:  Preston Vargas was seen and examined. Past Medical History:   Diagnosis Date    Depression     anxiety    Diabetes ([address]     Type II    GERD (gastroesophageal reflux disease)     Headache(784.0)     migraines    Hx of colonic polyps     Menopause     PUD (peptic ulcer disease)      Prior to Admission Medications   Prescriptions Last Dose Informant Patient Reported? Taking? ACCU-CHEK KALYAN PLUS TEST STRP strip 4/15/2018 at Unknown time  No Yes   Sig: USE TO CHECK BLOOD SUGAR DAILY   Gcibaxdh-Pgvt-Nvnvrb-Hyalur Ac 957-731-21-2 mg Cap 2018 at Unknown time  Yes Yes   Sig: Take  by mouth. MULTIVITAMIN WITH MINERALS (MULTIPLE VITAMINS 55 PLUS PO) 2018 at Unknown time  Yes Yes   Sig: Take  by mouth. aspirin delayed-release 81 mg tablet 2018 at Unknown time  Yes Yes   Sig: Take  by mouth daily. fluticasone (FLONASE) 50 mcg/actuation nasal spray 2018 at Unknown time  No Yes   Si Sprays by Both Nostrils route daily. Patient taking differently: 1 Spray by Both Nostrils route daily. metFORMIN ER (GLUCOPHAGE XR) 500 mg tablet 4/15/2018 at Unknown time  Yes Yes   Si,000 mg two (2) times a day.    rizatriptan (MAXALT) 10 mg tablet 2018 at Unknown time  No Yes   Sig: Take 1 Tab by mouth once as needed. May repeat in 2 hours if needed      Facility-Administered Medications: None      Allergy to:Codeine  Physical Examination: General appearance - alert, well appearing, and in no distress  Mental status - alert, oriented to person, place, and time  Chest - clear to auscultation  Heart - normal rate and regular rhythm  Neurological - alert, oriented, normal speech  Extremities - intact peripheral pulses, painful/limited ROM left hip  Skin - normal   History and physical has been reviewed. The patient has been examined.  There have been no significant clinical changes since the completion of the originally dated History and Physical.    Signed By: MINDY Fish     April 16, 2018 10:45 AM

## 2018-04-16 ENCOUNTER — HOSPITAL ENCOUNTER (INPATIENT)
Age: 63
LOS: 1 days | Discharge: HOME | DRG: 470 | End: 2018-04-17
Attending: ORTHOPAEDIC SURGERY | Admitting: ORTHOPAEDIC SURGERY
Payer: COMMERCIAL

## 2018-04-16 ENCOUNTER — ANESTHESIA EVENT (OUTPATIENT)
Dept: SURGERY | Age: 63
DRG: 470 | End: 2018-04-16
Payer: COMMERCIAL

## 2018-04-16 ENCOUNTER — ANESTHESIA (OUTPATIENT)
Dept: SURGERY | Age: 63
DRG: 470 | End: 2018-04-16
Payer: COMMERCIAL

## 2018-04-16 ENCOUNTER — APPOINTMENT (OUTPATIENT)
Dept: GENERAL RADIOLOGY | Age: 63
DRG: 470 | End: 2018-04-16
Attending: ORTHOPAEDIC SURGERY
Payer: COMMERCIAL

## 2018-04-16 DIAGNOSIS — M16.12 PRIMARY OSTEOARTHRITIS OF LEFT HIP: Primary | ICD-10-CM

## 2018-04-16 LAB
ABO + RH BLD: NORMAL
BLOOD GROUP ANTIBODIES SERPL: NORMAL
GLUCOSE BLD STRIP.AUTO-MCNC: 114 MG/DL (ref 65–100)
GLUCOSE BLD STRIP.AUTO-MCNC: 116 MG/DL (ref 65–100)
GLUCOSE BLD STRIP.AUTO-MCNC: 254 MG/DL (ref 65–100)
GLUCOSE BLD STRIP.AUTO-MCNC: 77 MG/DL (ref 65–100)
GLUCOSE BLD STRIP.AUTO-MCNC: 88 MG/DL (ref 65–100)
SERVICE CMNT-IMP: ABNORMAL
SERVICE CMNT-IMP: NORMAL
SERVICE CMNT-IMP: NORMAL
SPECIMEN EXP DATE BLD: NORMAL

## 2018-04-16 PROCEDURE — 77030002933 HC SUT MCRYL J&J -A: Performed by: ORTHOPAEDIC SURGERY

## 2018-04-16 PROCEDURE — 77030012935 HC DRSG AQUACEL BMS -B: Performed by: ORTHOPAEDIC SURGERY

## 2018-04-16 PROCEDURE — 74011250636 HC RX REV CODE- 250/636: Performed by: ANESTHESIOLOGY

## 2018-04-16 PROCEDURE — 36415 COLL VENOUS BLD VENIPUNCTURE: CPT | Performed by: ORTHOPAEDIC SURGERY

## 2018-04-16 PROCEDURE — 77030003666 HC NDL SPINAL BD -A: Performed by: ORTHOPAEDIC SURGERY

## 2018-04-16 PROCEDURE — 77030014125 HC TY EPDRL BBMI -B: Performed by: ANESTHESIOLOGY

## 2018-04-16 PROCEDURE — 65270000029 HC RM PRIVATE

## 2018-04-16 PROCEDURE — 77030011640 HC PAD GRND REM COVD -A: Performed by: ORTHOPAEDIC SURGERY

## 2018-04-16 PROCEDURE — 97116 GAIT TRAINING THERAPY: CPT

## 2018-04-16 PROCEDURE — 77030011264 HC ELECTRD BLD EXT COVD -A: Performed by: ORTHOPAEDIC SURGERY

## 2018-04-16 PROCEDURE — 77030031139 HC SUT VCRL2 J&J -A: Performed by: ORTHOPAEDIC SURGERY

## 2018-04-16 PROCEDURE — G8979 MOBILITY GOAL STATUS: HCPCS

## 2018-04-16 PROCEDURE — 76210000006 HC OR PH I REC 0.5 TO 1 HR: Performed by: ORTHOPAEDIC SURGERY

## 2018-04-16 PROCEDURE — 74011250636 HC RX REV CODE- 250/636

## 2018-04-16 PROCEDURE — 0SRB04A REPLACEMENT OF LEFT HIP JOINT WITH CERAMIC ON POLYETHYLENE SYNTHETIC SUBSTITUTE, UNCEMENTED, OPEN APPROACH: ICD-10-PCS | Performed by: ORTHOPAEDIC SURGERY

## 2018-04-16 PROCEDURE — 74011250636 HC RX REV CODE- 250/636: Performed by: PHYSICIAN ASSISTANT

## 2018-04-16 PROCEDURE — 76010000171 HC OR TIME 2 TO 2.5 HR INTENSV-TIER 1: Performed by: ORTHOPAEDIC SURGERY

## 2018-04-16 PROCEDURE — 74011000250 HC RX REV CODE- 250: Performed by: ORTHOPAEDIC SURGERY

## 2018-04-16 PROCEDURE — 74011000250 HC RX REV CODE- 250

## 2018-04-16 PROCEDURE — 74011250636 HC RX REV CODE- 250/636: Performed by: ORTHOPAEDIC SURGERY

## 2018-04-16 PROCEDURE — 77030034850: Performed by: ORTHOPAEDIC SURGERY

## 2018-04-16 PROCEDURE — 77030006835 HC BLD SAW SAG STRY -B: Performed by: ORTHOPAEDIC SURGERY

## 2018-04-16 PROCEDURE — C1776 JOINT DEVICE (IMPLANTABLE): HCPCS | Performed by: ORTHOPAEDIC SURGERY

## 2018-04-16 PROCEDURE — 77030032490 HC SLV COMPR SCD KNE COVD -B: Performed by: ORTHOPAEDIC SURGERY

## 2018-04-16 PROCEDURE — 97161 PT EVAL LOW COMPLEX 20 MIN: CPT

## 2018-04-16 PROCEDURE — 73501 X-RAY EXAM HIP UNI 1 VIEW: CPT

## 2018-04-16 PROCEDURE — 74011000258 HC RX REV CODE- 258

## 2018-04-16 PROCEDURE — 77030018846 HC SOL IRR STRL H20 ICUM -A: Performed by: ORTHOPAEDIC SURGERY

## 2018-04-16 PROCEDURE — G8978 MOBILITY CURRENT STATUS: HCPCS

## 2018-04-16 PROCEDURE — 77030020782 HC GWN BAIR PAWS FLX 3M -B

## 2018-04-16 PROCEDURE — 76000 FLUOROSCOPY <1 HR PHYS/QHP: CPT

## 2018-04-16 PROCEDURE — 82962 GLUCOSE BLOOD TEST: CPT

## 2018-04-16 PROCEDURE — 77030010507 HC ADH SKN DERMBND J&J -B: Performed by: ORTHOPAEDIC SURGERY

## 2018-04-16 PROCEDURE — 77030035236 HC SUT PDS STRATFX BARB J&J -B: Performed by: ORTHOPAEDIC SURGERY

## 2018-04-16 PROCEDURE — 77030018836 HC SOL IRR NACL ICUM -A: Performed by: ORTHOPAEDIC SURGERY

## 2018-04-16 PROCEDURE — 86900 BLOOD TYPING SEROLOGIC ABO: CPT | Performed by: ORTHOPAEDIC SURGERY

## 2018-04-16 PROCEDURE — 77030020788: Performed by: ORTHOPAEDIC SURGERY

## 2018-04-16 PROCEDURE — 74011250637 HC RX REV CODE- 250/637: Performed by: PHYSICIAN ASSISTANT

## 2018-04-16 PROCEDURE — 76060000035 HC ANESTHESIA 2 TO 2.5 HR: Performed by: ORTHOPAEDIC SURGERY

## 2018-04-16 DEVICE — COMPONENT TOT HIP PRIMARY CERM ALTRX: Type: IMPLANTABLE DEVICE | Site: HIP | Status: FUNCTIONAL

## 2018-04-16 DEVICE — BIOLOX DELTA CERAMIC FEMORAL HEAD +1.5 36MM DIA 12/14 TAPER
Type: IMPLANTABLE DEVICE | Site: HIP | Status: FUNCTIONAL
Brand: BIOLOX DELTA

## 2018-04-16 DEVICE — SUMMIT FEMORAL STEM 12/14 TAPER TAPER ED W/POROCOAT SIZE 3 STD 135MM
Type: IMPLANTABLE DEVICE | Site: HIP | Status: FUNCTIONAL
Brand: SUMMIT POROCOAT

## 2018-04-16 DEVICE — LINER ACET OD52MM ID36MM HIP ALTRX PINN: Type: IMPLANTABLE DEVICE | Site: HIP | Status: FUNCTIONAL

## 2018-04-16 DEVICE — CUP ACET DIA52MM HIP GRIPTION PRI CEMENTLESS FIX SECT SER: Type: IMPLANTABLE DEVICE | Site: HIP | Status: FUNCTIONAL

## 2018-04-16 DEVICE — SCREW BNE L25MM DIA6.5MM CANC HIP S STL GRIPTION FULL THRD: Type: IMPLANTABLE DEVICE | Site: HIP | Status: FUNCTIONAL

## 2018-04-16 RX ORDER — MIDAZOLAM HYDROCHLORIDE 1 MG/ML
1 INJECTION, SOLUTION INTRAMUSCULAR; INTRAVENOUS AS NEEDED
Status: DISCONTINUED | OUTPATIENT
Start: 2018-04-16 | End: 2018-04-16 | Stop reason: HOSPADM

## 2018-04-16 RX ORDER — PROPOFOL 10 MG/ML
INJECTION, EMULSION INTRAVENOUS AS NEEDED
Status: DISCONTINUED | OUTPATIENT
Start: 2018-04-16 | End: 2018-04-16 | Stop reason: HOSPADM

## 2018-04-16 RX ORDER — RIZATRIPTAN BENZOATE 10 MG/1
10 TABLET ORAL
Status: DISCONTINUED | OUTPATIENT
Start: 2018-04-16 | End: 2018-04-17 | Stop reason: HOSPADM

## 2018-04-16 RX ORDER — BUPIVACAINE HYDROCHLORIDE 5 MG/ML
INJECTION, SOLUTION EPIDURAL; INTRACAUDAL AS NEEDED
Status: DISCONTINUED | OUTPATIENT
Start: 2018-04-16 | End: 2018-04-16 | Stop reason: HOSPADM

## 2018-04-16 RX ORDER — METFORMIN HYDROCHLORIDE 500 MG/1
1000 TABLET, EXTENDED RELEASE ORAL 2 TIMES DAILY
Status: DISCONTINUED | OUTPATIENT
Start: 2018-04-17 | End: 2018-04-17 | Stop reason: HOSPADM

## 2018-04-16 RX ORDER — MIDAZOLAM HYDROCHLORIDE 1 MG/ML
INJECTION, SOLUTION INTRAMUSCULAR; INTRAVENOUS AS NEEDED
Status: DISCONTINUED | OUTPATIENT
Start: 2018-04-16 | End: 2018-04-16 | Stop reason: HOSPADM

## 2018-04-16 RX ORDER — SODIUM CHLORIDE, SODIUM LACTATE, POTASSIUM CHLORIDE, CALCIUM CHLORIDE 600; 310; 30; 20 MG/100ML; MG/100ML; MG/100ML; MG/100ML
25 INJECTION, SOLUTION INTRAVENOUS CONTINUOUS
Status: DISCONTINUED | OUTPATIENT
Start: 2018-04-16 | End: 2018-04-16 | Stop reason: HOSPADM

## 2018-04-16 RX ORDER — ACETAMINOPHEN 500 MG
1000 TABLET ORAL ONCE
Status: COMPLETED | OUTPATIENT
Start: 2018-04-16 | End: 2018-04-16

## 2018-04-16 RX ORDER — CEFAZOLIN SODIUM/WATER 2 G/20 ML
2 SYRINGE (ML) INTRAVENOUS ONCE
Status: COMPLETED | OUTPATIENT
Start: 2018-04-16 | End: 2018-04-16

## 2018-04-16 RX ORDER — SODIUM CHLORIDE 0.9 % (FLUSH) 0.9 %
5-10 SYRINGE (ML) INJECTION EVERY 8 HOURS
Status: DISCONTINUED | OUTPATIENT
Start: 2018-04-17 | End: 2018-04-17 | Stop reason: HOSPADM

## 2018-04-16 RX ORDER — LIDOCAINE HYDROCHLORIDE 20 MG/ML
INJECTION, SOLUTION EPIDURAL; INFILTRATION; INTRACAUDAL; PERINEURAL AS NEEDED
Status: DISCONTINUED | OUTPATIENT
Start: 2018-04-16 | End: 2018-04-16 | Stop reason: HOSPADM

## 2018-04-16 RX ORDER — SODIUM CHLORIDE, SODIUM LACTATE, POTASSIUM CHLORIDE, CALCIUM CHLORIDE 600; 310; 30; 20 MG/100ML; MG/100ML; MG/100ML; MG/100ML
125 INJECTION, SOLUTION INTRAVENOUS CONTINUOUS
Status: DISCONTINUED | OUTPATIENT
Start: 2018-04-16 | End: 2018-04-16 | Stop reason: HOSPADM

## 2018-04-16 RX ORDER — MORPHINE SULFATE 10 MG/ML
2 INJECTION, SOLUTION INTRAMUSCULAR; INTRAVENOUS
Status: DISCONTINUED | OUTPATIENT
Start: 2018-04-16 | End: 2018-04-16 | Stop reason: HOSPADM

## 2018-04-16 RX ORDER — FLUTICASONE PROPIONATE 50 MCG
1 SPRAY, SUSPENSION (ML) NASAL DAILY
Status: DISCONTINUED | OUTPATIENT
Start: 2018-04-17 | End: 2018-04-17 | Stop reason: HOSPADM

## 2018-04-16 RX ORDER — ONDANSETRON 2 MG/ML
INJECTION INTRAMUSCULAR; INTRAVENOUS AS NEEDED
Status: DISCONTINUED | OUTPATIENT
Start: 2018-04-16 | End: 2018-04-16 | Stop reason: HOSPADM

## 2018-04-16 RX ORDER — NALOXONE HYDROCHLORIDE 0.4 MG/ML
0.4 INJECTION, SOLUTION INTRAMUSCULAR; INTRAVENOUS; SUBCUTANEOUS AS NEEDED
Status: DISCONTINUED | OUTPATIENT
Start: 2018-04-16 | End: 2018-04-17 | Stop reason: HOSPADM

## 2018-04-16 RX ORDER — FACIAL-BODY WIPES
10 EACH TOPICAL DAILY PRN
Status: DISCONTINUED | OUTPATIENT
Start: 2018-04-18 | End: 2018-04-17 | Stop reason: HOSPADM

## 2018-04-16 RX ORDER — CEFAZOLIN SODIUM/WATER 2 G/20 ML
2 SYRINGE (ML) INTRAVENOUS EVERY 8 HOURS
Status: COMPLETED | OUTPATIENT
Start: 2018-04-16 | End: 2018-04-17

## 2018-04-16 RX ORDER — FENTANYL CITRATE 50 UG/ML
25 INJECTION, SOLUTION INTRAMUSCULAR; INTRAVENOUS
Status: DISCONTINUED | OUTPATIENT
Start: 2018-04-16 | End: 2018-04-16 | Stop reason: HOSPADM

## 2018-04-16 RX ORDER — ACETAMINOPHEN 325 MG/1
650 TABLET ORAL
Status: DISCONTINUED | OUTPATIENT
Start: 2018-04-16 | End: 2018-04-17 | Stop reason: HOSPADM

## 2018-04-16 RX ORDER — ONDANSETRON 2 MG/ML
4 INJECTION INTRAMUSCULAR; INTRAVENOUS
Status: DISCONTINUED | OUTPATIENT
Start: 2018-04-16 | End: 2018-04-17 | Stop reason: HOSPADM

## 2018-04-16 RX ORDER — ONDANSETRON 2 MG/ML
4 INJECTION INTRAMUSCULAR; INTRAVENOUS AS NEEDED
Status: DISCONTINUED | OUTPATIENT
Start: 2018-04-16 | End: 2018-04-16 | Stop reason: HOSPADM

## 2018-04-16 RX ORDER — OXYCODONE HYDROCHLORIDE 5 MG/1
10 TABLET ORAL
Status: DISCONTINUED | OUTPATIENT
Start: 2018-04-16 | End: 2018-04-17 | Stop reason: HOSPADM

## 2018-04-16 RX ORDER — MIDAZOLAM HYDROCHLORIDE 1 MG/ML
0.5 INJECTION, SOLUTION INTRAMUSCULAR; INTRAVENOUS
Status: DISCONTINUED | OUTPATIENT
Start: 2018-04-16 | End: 2018-04-16 | Stop reason: HOSPADM

## 2018-04-16 RX ORDER — OXYCODONE HYDROCHLORIDE 5 MG/1
5 TABLET ORAL
Status: DISCONTINUED | OUTPATIENT
Start: 2018-04-16 | End: 2018-04-17 | Stop reason: HOSPADM

## 2018-04-16 RX ORDER — SODIUM CHLORIDE 0.9 % (FLUSH) 0.9 %
5-10 SYRINGE (ML) INJECTION AS NEEDED
Status: DISCONTINUED | OUTPATIENT
Start: 2018-04-16 | End: 2018-04-16 | Stop reason: HOSPADM

## 2018-04-16 RX ORDER — HYDROXYZINE HYDROCHLORIDE 10 MG/1
10 TABLET, FILM COATED ORAL
Status: DISCONTINUED | OUTPATIENT
Start: 2018-04-16 | End: 2018-04-17 | Stop reason: HOSPADM

## 2018-04-16 RX ORDER — PREGABALIN 75 MG/1
75 CAPSULE ORAL ONCE
Status: COMPLETED | OUTPATIENT
Start: 2018-04-16 | End: 2018-04-16

## 2018-04-16 RX ORDER — POLYETHYLENE GLYCOL 3350 17 G/17G
17 POWDER, FOR SOLUTION ORAL DAILY
Status: DISCONTINUED | OUTPATIENT
Start: 2018-04-17 | End: 2018-04-17 | Stop reason: HOSPADM

## 2018-04-16 RX ORDER — SODIUM CHLORIDE 9 MG/ML
125 INJECTION, SOLUTION INTRAVENOUS CONTINUOUS
Status: DISCONTINUED | OUTPATIENT
Start: 2018-04-16 | End: 2018-04-17 | Stop reason: HOSPADM

## 2018-04-16 RX ORDER — LIDOCAINE HYDROCHLORIDE 10 MG/ML
0.1 INJECTION, SOLUTION EPIDURAL; INFILTRATION; INTRACAUDAL; PERINEURAL AS NEEDED
Status: DISCONTINUED | OUTPATIENT
Start: 2018-04-16 | End: 2018-04-16 | Stop reason: HOSPADM

## 2018-04-16 RX ORDER — SODIUM CHLORIDE 0.9 % (FLUSH) 0.9 %
5-10 SYRINGE (ML) INJECTION AS NEEDED
Status: DISCONTINUED | OUTPATIENT
Start: 2018-04-16 | End: 2018-04-17 | Stop reason: HOSPADM

## 2018-04-16 RX ORDER — CELECOXIB 200 MG/1
200 CAPSULE ORAL 2 TIMES DAILY
Status: DISCONTINUED | OUTPATIENT
Start: 2018-04-16 | End: 2018-04-17 | Stop reason: HOSPADM

## 2018-04-16 RX ORDER — HYDROMORPHONE HYDROCHLORIDE 2 MG/ML
0.5 INJECTION, SOLUTION INTRAMUSCULAR; INTRAVENOUS; SUBCUTANEOUS
Status: DISCONTINUED | OUTPATIENT
Start: 2018-04-16 | End: 2018-04-17 | Stop reason: HOSPADM

## 2018-04-16 RX ORDER — AMOXICILLIN 250 MG
1 CAPSULE ORAL 2 TIMES DAILY
Status: DISCONTINUED | OUTPATIENT
Start: 2018-04-16 | End: 2018-04-17 | Stop reason: HOSPADM

## 2018-04-16 RX ORDER — DIPHENHYDRAMINE HYDROCHLORIDE 50 MG/ML
12.5 INJECTION, SOLUTION INTRAMUSCULAR; INTRAVENOUS AS NEEDED
Status: DISCONTINUED | OUTPATIENT
Start: 2018-04-16 | End: 2018-04-16 | Stop reason: HOSPADM

## 2018-04-16 RX ORDER — OXYCODONE HYDROCHLORIDE 5 MG/1
5 TABLET ORAL AS NEEDED
Status: DISCONTINUED | OUTPATIENT
Start: 2018-04-16 | End: 2018-04-16 | Stop reason: HOSPADM

## 2018-04-16 RX ORDER — OXYCODONE HCL 10 MG/1
10 TABLET, FILM COATED, EXTENDED RELEASE ORAL ONCE
Status: COMPLETED | OUTPATIENT
Start: 2018-04-16 | End: 2018-04-16

## 2018-04-16 RX ORDER — SODIUM CHLORIDE 0.9 % (FLUSH) 0.9 %
5-10 SYRINGE (ML) INJECTION EVERY 8 HOURS
Status: DISCONTINUED | OUTPATIENT
Start: 2018-04-16 | End: 2018-04-16 | Stop reason: HOSPADM

## 2018-04-16 RX ORDER — CELECOXIB 200 MG/1
200 CAPSULE ORAL ONCE
Status: COMPLETED | OUTPATIENT
Start: 2018-04-16 | End: 2018-04-16

## 2018-04-16 RX ORDER — BUPIVACAINE HYDROCHLORIDE AND EPINEPHRINE 5; 5 MG/ML; UG/ML
INJECTION, SOLUTION EPIDURAL; INTRACAUDAL; PERINEURAL AS NEEDED
Status: DISCONTINUED | OUTPATIENT
Start: 2018-04-16 | End: 2018-04-16 | Stop reason: HOSPADM

## 2018-04-16 RX ORDER — SODIUM CHLORIDE 9 MG/ML
25 INJECTION, SOLUTION INTRAVENOUS CONTINUOUS
Status: DISCONTINUED | OUTPATIENT
Start: 2018-04-16 | End: 2018-04-16 | Stop reason: HOSPADM

## 2018-04-16 RX ORDER — PROPOFOL 10 MG/ML
INJECTION, EMULSION INTRAVENOUS
Status: DISCONTINUED | OUTPATIENT
Start: 2018-04-16 | End: 2018-04-16 | Stop reason: HOSPADM

## 2018-04-16 RX ORDER — FENTANYL CITRATE 50 UG/ML
50 INJECTION, SOLUTION INTRAMUSCULAR; INTRAVENOUS AS NEEDED
Status: DISCONTINUED | OUTPATIENT
Start: 2018-04-16 | End: 2018-04-16 | Stop reason: HOSPADM

## 2018-04-16 RX ADMIN — LIDOCAINE HYDROCHLORIDE 20 MG: 20 INJECTION, SOLUTION EPIDURAL; INFILTRATION; INTRACAUDAL; PERINEURAL at 11:20

## 2018-04-16 RX ADMIN — Medication 2 G: at 20:41

## 2018-04-16 RX ADMIN — RIVAROXABAN 10 MG: 10 TABLET, FILM COATED ORAL at 20:40

## 2018-04-16 RX ADMIN — OXYCODONE HYDROCHLORIDE 5 MG: 5 TABLET ORAL at 18:37

## 2018-04-16 RX ADMIN — CELECOXIB 200 MG: 200 CAPSULE ORAL at 20:40

## 2018-04-16 RX ADMIN — CELECOXIB 200 MG: 200 CAPSULE ORAL at 10:56

## 2018-04-16 RX ADMIN — MIDAZOLAM HYDROCHLORIDE 1 MG: 1 INJECTION, SOLUTION INTRAMUSCULAR; INTRAVENOUS at 11:26

## 2018-04-16 RX ADMIN — ONDANSETRON 4 MG: 2 INJECTION INTRAMUSCULAR; INTRAVENOUS at 11:52

## 2018-04-16 RX ADMIN — ACETAMINOPHEN 1000 MG: 500 TABLET, FILM COATED ORAL at 10:56

## 2018-04-16 RX ADMIN — PROPOFOL 20 MG: 10 INJECTION, EMULSION INTRAVENOUS at 11:20

## 2018-04-16 RX ADMIN — BUPIVACAINE HYDROCHLORIDE 13 MG: 5 INJECTION, SOLUTION EPIDURAL; INTRACAUDAL at 11:34

## 2018-04-16 RX ADMIN — PROPOFOL 20 MG: 10 INJECTION, EMULSION INTRAVENOUS at 12:24

## 2018-04-16 RX ADMIN — PREGABALIN 75 MG: 75 CAPSULE ORAL at 10:56

## 2018-04-16 RX ADMIN — SODIUM CHLORIDE 125 ML/HR: 900 INJECTION, SOLUTION INTRAVENOUS at 14:00

## 2018-04-16 RX ADMIN — PROPOFOL 20 MG: 10 INJECTION, EMULSION INTRAVENOUS at 11:23

## 2018-04-16 RX ADMIN — MIDAZOLAM HYDROCHLORIDE 1 MG: 1 INJECTION, SOLUTION INTRAMUSCULAR; INTRAVENOUS at 11:20

## 2018-04-16 RX ADMIN — PROPOFOL 50 MCG/KG/MIN: 10 INJECTION, EMULSION INTRAVENOUS at 11:17

## 2018-04-16 RX ADMIN — Medication 2 G: at 11:36

## 2018-04-16 RX ADMIN — PROPOFOL 20 MG: 10 INJECTION, EMULSION INTRAVENOUS at 11:27

## 2018-04-16 RX ADMIN — MIDAZOLAM HYDROCHLORIDE 2 MG: 1 INJECTION, SOLUTION INTRAMUSCULAR; INTRAVENOUS at 11:13

## 2018-04-16 RX ADMIN — ACETAMINOPHEN 650 MG: 325 TABLET, FILM COATED ORAL at 18:36

## 2018-04-16 RX ADMIN — SODIUM CHLORIDE, SODIUM LACTATE, POTASSIUM CHLORIDE, AND CALCIUM CHLORIDE 25 ML/HR: 600; 310; 30; 20 INJECTION, SOLUTION INTRAVENOUS at 11:04

## 2018-04-16 RX ADMIN — STANDARDIZED SENNA CONCENTRATE AND DOCUSATE SODIUM 1 TABLET: 8.6; 5 TABLET, FILM COATED ORAL at 18:36

## 2018-04-16 RX ADMIN — OXYCODONE HYDROCHLORIDE 10 MG: 10 TABLET, FILM COATED, EXTENDED RELEASE ORAL at 10:56

## 2018-04-16 RX ADMIN — SODIUM CHLORIDE, SODIUM LACTATE, POTASSIUM CHLORIDE, AND CALCIUM CHLORIDE: 600; 310; 30; 20 INJECTION, SOLUTION INTRAVENOUS at 13:08

## 2018-04-16 RX ADMIN — SODIUM CHLORIDE 125 ML/HR: 900 INJECTION, SOLUTION INTRAVENOUS at 22:16

## 2018-04-16 RX ADMIN — ROPIVACAINE HYDROCHLORIDE: 5 INJECTION, SOLUTION EPIDURAL; INFILTRATION; PERINEURAL at 13:00

## 2018-04-16 NOTE — IP AVS SNAPSHOT
[address] [address] 
[phone] Patient: Claudell Romano MRN: [id number] PUL:[birth year] About your hospitalization You were admitted on:  April 16, 2018 You last received care in the:  [address] [facility] You were discharged on:  April 17, 2018 Why you were hospitalized Your primary diagnosis was:  Osteoarthritis Of Left Hip Follow-up Information Follow up With Details Comments Contact Info Lili Britt MD   [address] [facility] [place] [address] 
[phone] [facility] [address]  for home health skilled nursing and physical therapy. [address] [facility] [address] 
[phone] Discharge Orders None A check nick indicates which time of day the medication should be taken. My Medications START taking these medications Instructions Each Dose to Equal  
 Morning Noon Evening Bedtime  
 meloxicam 7.5 mg tablet Commonly known as:  MOBIC Your last dose was: Your next dose is: Take 1 Tab by mouth daily. 7.5 mg  
    
   
   
   
  
 oxyCODONE IR 5 mg immediate release tablet Commonly known as:  Nae Briggs Your last dose was: Your next dose is: Take 1 Tab by mouth every three (3) hours as needed. Max Daily Amount: 40 mg.  
 5 mg  
    
   
   
   
  
 rivaroxaban 10 mg tablet Commonly known as:  Wsiam Dick Your last dose was: Your next dose is: Take 1 Tab by mouth daily (with lunch). Indications: HIP SURGERY DEEP VEIN THROMBOSIS PREVENTION  
 10 mg CHANGE how you take these medications Instructions Each Dose to Equal  
 Morning Noon Evening Bedtime  
 fluticasone 50 mcg/actuation nasal spray Commonly known as:  Beti Castro What changed:  how much to take Your last dose was: Your next dose is: 2 Sprays by Both Nostrils route daily. 2 Spray CONTINUE taking these medications Instructions Each Dose to Equal  
 Morning Noon Evening Bedtime ACCU-CHEK KALYAN PLUS TEST STRP strip Generic drug:  glucose blood VI test strips Your last dose was: Your next dose is:    
   
   
 USE TO CHECK BLOOD SUGAR DAILY Ivfzxedy-Eisg-Zmgnxi-Hyalur Ac [id number] mg Cap Your last dose was: Your next dose is: Take  by mouth.  
     
   
   
   
  
 metFORMIN  mg tablet Commonly known as:  GLUCOPHAGE XR Your last dose was: Your next dose is:    
   
   
 1,000 mg two (2) times a day. 1000 mg MULTIPLE VITAMINS 55 PLUS PO Your last dose was: Your next dose is: Take  by mouth.  
     
   
   
   
  
 rizatriptan 10 mg tablet Commonly known as:  Giovanna Forward Your last dose was: Your next dose is: Take 1 Tab by mouth once as needed. May repeat in 2 hours if needed 10 mg  
    
   
   
   
  
  
STOP taking these medications   
 aspirin delayed-release 81 mg tablet Where to Get Your Medications Information on where to get these meds will be given to you by the nurse or doctor. ! Ask your nurse or doctor about these medications  
  meloxicam 7.5 mg tablet  
 oxyCODONE IR 5 mg immediate release tablet  
 rivaroxaban 10 mg tablet Opioid Education Prescription Opioids: What You Need to Know: 
 
Prescription opioids can be used to help relieve moderate-to-severe pain and are often prescribed following a surgery or injury, or for certain health conditions. These medications can be an important part of treatment but also come with serious risks. Opioids are strong pain medicines.  Examples include hydrocodone, oxycodone, fentanyl, and morphine. Heroin is an example of an illegal opioid. It is important to work with your health care provider to make sure you are getting the safest, most effective care. WHAT ARE THE RISKS AND SIDE EFFECTS OF OPIOID USE? Prescription opioids carry serious risks of addiction and overdose, especially with prolonged use. An opioid overdose, often marked by slow breathing, can cause sudden death. The use of prescription opioids can have a number of side effects as well, even when taken as directed. · Tolerance-meaning you might need to take more of a medication for the same pain relief · Physical dependence-meaning you have symptoms of withdrawal when the medication is stopped. Withdrawal symptoms can include nausea, sweating, chills, diarrhea, stomach cramps, and muscle aches. Withdrawal can last up to several weeks, depending on which drug you took and how long you took it. · Increased sensitivity to pain · Constipation · Nausea, vomiting, and dry mouth · Sleepiness and dizziness · Confusion · Depression · Low levels of testosterone that can result in lower sex drive, energy, and strength · Itching and sweating RISKS ARE GREATER WITH:      
· History of drug misuse, substance use disorder, or overdose · Mental health conditions (such as depression or anxiety) · Sleep apnea · Older age (72 years or older) · Pregnancy Avoid alcohol while taking prescription opioids. Also, unless specifically advised by your health care provider, medications to avoid include: · Benzodiazepines (such as Xanax or Valium) · Muscle relaxants (such as Soma or Flexeril) · Hypnotics (such as Ambien or Lunesta) · Other prescription opioids KNOW YOUR OPTIONS Talk to your health care provider about ways to manage your pain that don't involve prescription opioids. Some of these options may actually work better and have fewer risks and side effects. Options may include: · Pain relievers such as acetaminophen, ibuprofen, and naproxen · Some medications that are also used for depression or seizures · Physical therapy and exercise · Counseling to help patients learn how to cope better with triggers of pain and stress. · Application of heat or cold compress · Massage therapy · Relaxation techniques Be Informed Make sure you know the name of your medication, how much and how often to take it, and its potential risks & side effects. IF YOU ARE PRESCRIBED OPIOIDS FOR PAIN: 
· Never take opioids in greater amounts or more often than prescribed. Remember the goal is not to be pain-free but to manage your pain at a tolerable level. · Follow up with your primary care provider to: · Work together to create a plan on how to manage your pain. · Talk about ways to help manage your pain that don't involve prescription opioids. · Talk about any and all concerns and side effects. · Help prevent misuse and abuse. · Never sell or share prescription opioids · Help prevent misuse and abuse. · Store prescription opioids in a secure place and out of reach of others (this may include visitors, children, friends, and family). · Safely dispose of unused/unwanted prescription opioids: Find your community drug take-back program or your pharmacy mail-back program, or flush them down the toilet, following guidance from the Food and Drug Administration (www.fda.gov/Drugs/ResourcesForYou). · Visit www.cdc.gov/drugoverdose to learn about the risks of opioid abuse and overdose. · If you believe you may be struggling with addiction, tell your health care provider and ask for guidance or call [employer] at 0-713-665-PLIH. Discharge Instructions After Hospital Care Plan/Discharge Instructions Anterior Approach Hip Replacement- Dr. Josue Martinez [facility] of [facility]  4/16/2018 Procedure  Procedure(s): LEFT TOTAL HIP ARTHROPLASTY ANTERIOR APPROACH (CHOICE ANESTHESIA) Surgeon  Surgeon(s) and Role: Murlean Bernheim, MD - Primary PCP: Corry Britt MD 
Date of discharge: No discharge date for patient encounter. Follow up appointments ? Follow up with Dr. Bird calix in 2 weeks. Call [phone] to make an appointment. ? If home health has been arranged for you the agency will contact you to arrange dates/times for visits. Please call them if you do not hear from them within 24 hours after you are discharged When to call your Orthopaedic Surgeon: Call [phone]. If you call after 5pm or on a weekend, the on call physician will be contacted ? Increased hip pain, unrelieved pain or if you have difficulty or are unable to walk ? Signs of infection-if your incision is red; continues to have drainage; drainage has a foul odor or if you have a persistent fever over 101 degrees ? Signs of a blood clot in your leg-calf pain, tenderness, redness, swelling of lower leg When to call your Primary Care Physician: 
? Concerns about medical conditions such as diabetes, high blood pressure, asthma, congestive heart failure Call if blood sugars are elevated, persistent headache or dizziness, coughing or congestion, constipation or diarrhea, burning with urination, abnormal heart rate When to call 9[address]zke go to the nearest emergency room ? Acute onset of chest pain, shortness of breath, difficulty breathing Activity ? Weight bearing as tolerated with walker or crutches. Refer to pages 23-33 of your handbook for instructions and pictures ? Complete your Home Exercise Program daily as instructed by your therapist.  Refer to pages 36-42 of your handbook for instructions and pictures ? Get up every one hour and walk (except at night when sleeping) ? AVOID sudden and extreme movement of your hip (surgical leg) ? Do not drive or operate heavy machinery Incision Care ? The Aquacel (brown, waterproof) surgical dressing is to remain on your hip for 7 days. On the 7th day have someone gently peel the dressing off by carefully lifting the edge and stretching it slightly to break the adhesive seal 
? You may now leave your incision open to air. You will have absorbable sutures in your incision ? If your Aquacel dressing comes loose/off before the 7th day, you may replace it with a dry sterile gauze dressing; change it daily. Once your incision is not draining, you may leave it open to air ? You may take a shower with the Aquacel dressing in place. Once the Aquacel is removed, you may shower and get your incision wet but do not submerge your incision under water in a bath tub, hot tub or swimming pool for 6 weeks after surgery. Preventing blood clots Take Xarelto at 12 noon daily as prescribed by Dr. Francine Azar Pain management ? Take pain medication as prescribed; decrease the amount you use as your pain lessens ? Avoid alcoholic beverages while taking pain medication ? Please be aware that many medications contain Tylenol. We do not want you to over medicate so please read the information below as a guide. Do not take more than 4 Grams of Tylenol in a 24 hour period. (There are 1000 milligrams in one Gram) o Percocet contains 325 mg of Tylenol per tablet (do not take more than 12 tablets in 24 hours) 
o Lortab contains 500 mg of Tylenol per tablet (do not take more than 8 tablets in 24 hours) o Norco contains 325 mg of Tylenol per tablet (do not take more than 12 tablets in 24 hours). ? You may place an ice bag on your hip for 15-20 minutes after exercising and as needed though out the day and night Diet ? Resume usual diet; drink plenty of fluids; eat foods high in fiber ?  You may want to take a stool softener (such as Senokot-S or Colace) to prevent constipation while you are taking pain medication. If constipation occurs, take a laxative (such as Dulcolax tablets, Milk of Magnesia, or a suppository) Home Health Care Protocol (to be followed by [address]) Nursing-per physicians order ? Complete head to toe assessment, vital signs ? Medication reconciliation ? Review pain management ? Manage chronic medical conditions Physical Therapy-per physicians order Weight bearing status: 
  
  
  
 
 
Mobility Status: 
  
  
  
  
 
Gait: 
  
  
  
  
 
ADL status overall composite: 
  
  
  
  
 
Physical Therapy ? Assessment and evaluation-bed mobility; functional transfers (bed, chair, bathroom, stairs); ambulation with equipment, car transfers, shower transfers, safety and ability to get out of house in the event of an emergency ? AVOID sudden and extreme movement of the hip (surgical leg) ? Discuss pain management ? Review how to do ADLs. Refer to pages 43-47 of handbook Home Exercise Program-refer to pages 36-42 of handbook Introducing Jv [employer]ogueira As a [facility] patient, I wanted to make you aware of our electronic visit tool called [employer]. [facility] 24/7 allows you to connect within minutes with a medical provider 24 hours a day, seven days a week via a mobile device or tablet or logging into a secure website from your computer. You can access Jv Nogueira from anywhere in the [place]. A virtual visit might be right for you when you have a simple condition and feel like you just dont want to get out of bed, or cant get away from work for an appointment, when your regular [facility] provider is not available (evenings, weekends or holidays), or when youre out of town and need minor care.   Electronic visits cost only $49 and if the Jv Nogueira provider determines a prescription is needed to treat your condition, one can be electronically transmitted to a nearby pharmacy*. Please take a moment to enroll today if you have not already done so. The enrollment process is free and takes just a few minutes. To enroll, please download the [employer] 24/7 chandan to your tablet or phone, or visit [email or web address] to enroll on your computer. And, as an [address] [place] [address] patient with a [employer] account, the results of your visits will be scanned into your electronic medical record and your primary care provider will be able to view the scanned results. We urge you to continue to see your regular [employer] provider for your ongoing medical care. And while your primary care provider may not be the one available when you seek a [employer] virtual visit, the peace of mind you get from getting a real diagnosis real time can be priceless. For more information on [employer], view our Frequently Asked Questions (FAQs) at [email or web address]. Sincerely, 
 
Vicky Hale MD 
Chief Medical Officer [address] [place] [address] *:  certain medications cannot be prescribed via [employer] Providers Seen During Your Hospitalization Provider Specialty Primary office phone Rupal Alvarez MD Orthopedic Surgery [phone] Your Primary Care Physician (PCP) Primary Care Physician Office Phone Office Fax [address] [facility] [place] [address] [phone] You are allergic to the following Allergen Reactions Codeine Unknown (comments) Recent Documentation Height Weight BMI OB Status Smoking Status 1.6 m 67.1 kg 26.22 kg/m2 Postmenopausal Former Smoker Emergency Contacts Name Discharge Info Relation Home Work Mobile [facility] DISCHARGE CAREGIVER [3] Spouse [3] [phone] Patient Belongings The following personal items are in your possession at time of discharge: Dental Appliances: None  Visual Aid: Glasses, With patient Please provide this summary of care documentation to your next provider. Signatures-by signing, you are acknowledging that this After Visit Summary has been reviewed with you and you have received a copy. Patient Signature:  ____________________________________________________________ Date:  ____________________________________________________________  
  
Gearldine Moulds Provider Signature:  ____________________________________________________________ Date:  ____________________________________________________________

## 2018-04-16 NOTE — PERIOP NOTES
TRANSFER - OUT REPORT:    Verbal report given to margarita Woodson Kin  being transferred to [facility](unit) for routine post - op       Report consisted of patients Situation, Background, Assessment and   Recommendations(SBAR). Time Pre op antibiotic given:2 grams ancef 5915   Anesthesia Stop time: 2888  Adair Present on Transfer to floor:yes  Order for Adair on Chart: yes    Discharge Prescriptions with Chart:no     Information from the following report(s) SBAR, OR Summary, Procedure Summary, Intake/Output, MAR, Recent Results and Cardiac Rhythm nsr/ sinus onesimo was reviewed with the receiving nurse. Opportunity for questions and clarification was provided. Is the patient on 02? L/Min        Other     Is the patient on a monitor? NO    Is the nurse transporting with the patient? NO    Surgical Waiting Area notified of patient's transfer from PACU?  YES      The following personal items collected during your admission accompanied patient upon transfer:   Dental Appliance: Dental Appliances: None  Vision:    Hearing Aid:    Jewelry:    Clothing:    Other Valuables:    Valuables sent to safe:      Glasses in bag x 1 on bed

## 2018-04-16 NOTE — OP NOTES
OPERATIVE NOTE    4/16/2018  DEGENERATIVE JOINT DISEASE OF LEFT HIP  DEGENERATIVE JOINT DISEASE OF LEFT HIP    PREOPERATIVE DIAGNOSIS: Osteoarthritis, left hip. POSTOPERATIVE DIAGNOSIS: Osteoarthritis, left hip. OPERATIVE PROCEDURE: left total hip replacement. SURGEON: Raul Benites MD    ASSISTANT SURGEON: Yenny [facility]    ANESTHESIA: epidural/spinal anesthesia    ESTIMATED BLOOD LOSS: 100cc. INDICATIONS: A 58 y.o.  female  with end stage osteoarthritis to the left hip, not responsive to conservative management. COMPLICATIONS: None    PROCEDURE: Patient was taken to the operating room and underwent placement of spinal anesthesia. The patient was placed on the [facility] table with both legs in boots, and intermittent compression hose placed on bilateral calves. The left hip and leg were prepped and draped in the usual fashion. Standard anterior approach was made to the hip, down through the skin and subcutaneous tissue. Hemostasis was obtained using Bovie apparatus. Fascia overlying the tensor muscle was incised longitudinally and the muscles were dissected off the fascia, and retracted posterolaterally. Bovie was used to incise the fascia. Circumflex vessels identified, clamped and cauterized. Retractors were placed medially and laterally around the femoral neck. .The capsule was incised longitudinally in a T-shape fashion. The capsule was dissected subperiosteally. Sutures were place at 2 ends of the capsule medially and laterally and used for attraction. Retractors were placed intracapsular. .Femoral neck was cut at the appropriate level. The femoral head was removed using corkscrew. Acetabular retractors were placed. The acetabulum was reamed up to a size 51 reamer. A size 52 acetabular component was impacted into the acetabulum in the appropriate amount of anteversion and horizontal inclination. A single screw was placed superiorly in the ilium with good fixation. Fluoroscopy was used to confirm placement of the cup and screw. This was irrigated, A 36 mm inner diameter polyethylene liner was impacted into the acetabular component. We then turned our attention to the femur. Capsular releases were performed. The leg was brought into hyperextension, and retractors placed, exposing the proximal femur. Canal finder was used to establish the canal. The femoral canal was reamed w/ the tapered reamers up to a size of 3. Canal was broached up to a size 3 broach. Trial reduction was performed, noting excellent range of motion and stability. Fluoroscopy was used to confirm size of the implant and leg lengths. Trials were removed. Canal was cleansed using pulsatile lavage system with antibiotic solution. A size 3 std offset stem was impacted down the intramedullary canal with good fixation. Again, trial reduction was performed, and the 1.5 mm head was chosen. Leg lengths were measured. The trial head was removed, and a 1.5 mm ceramic head was impacted onto the stem. The hip reduced. The hip was stable at 90 degrees of external rotation. Fluoroscopy was used to confirm placement of all hardware, and to measure leg length using a horizontal line parallel to bilateral ischium, looking at the level of the lesser trochanters. Joint was extensively irrigated with antibiotic solution. Capsular repair was performed using #2 Vicryl interrupted figure-of-eight fashion. Capsule was infiltrated with 30 mL of 0.5% Marcaine with epinephrine. Fascia over the tensor muscle was repaired using #2 Stratafix quilled suture in a running fashion. The subcutaneous tissue was approximated using 2-0 Vicryl in interrupted fashion. The skin edges were approximated using 3-0 Monocryl in running subcuticular fashion. Dermabond was applied. A bulky, sterile dressing was applied. The patient was transferred to the recovery room in stable condition. There were no complications.     SPECIMEN:* No specimens in log Otoniel Whitfield MD  4/16/2018  1:24 PM

## 2018-04-16 NOTE — PROGRESS NOTES
Pt with resolved anesthesia, 5/5 bilateral dorisfiex. Attempted eval, obtained history and pct checked pt's blood glucose and it was 77. PT will hold a this time while pt enjoys some crackers and juice in an effort to get her blood glucose to rise.  Marleny Butler, PT

## 2018-04-16 NOTE — PROGRESS NOTES
Problem: Mobility Impaired (Adult and Pediatric)  Goal: *Acute Goals and Plan of Care (Insert Text)  Physical Therapy Goals  Initiated 4/16/2018    1. Patient will move from supine to sit and sit to supine , scoot up and down and roll side to side in bed with independence within 4 days. 2. Patient will perform sit to stand with modified independence within 4 days. 3. Patient will ambulate with modified independence for 300 feet with the least restrictive device within 4 days. 4. Patient will ascend/descend 12 stairs with one handrail(s) with modified independence within 4 days. 5. Patient will perform hip home exercise program per protocol with independence within 4 days. physical Therapy EVALUATION  Patient: Preston Vargas (1 y.o. female)  Date: 4/16/2018  Primary Diagnosis: DEGENERATIVE JOINT DISEASE OF LEFT HIP  Osteoarthritis of left hip  Procedure(s) (LRB):  LEFT TOTAL HIP ARTHROPLASTY ANTERIOR APPROACH (CHOICE ANESTHESIA) (Left) Day of Surgery   Precautions:   WBAT, Fall    ASSESSMENT :  Based on the objective data described below, the patient presents with likely orthostatic hypotension in standing (unable to stand long enough for taking of BP) that limited eval. In supine /77 and HR 75. In sitting at EOB /79 and HR 75. She stood and reported feeling lightheaded, had to sit to manage. After she sat that BP was 131/74 and HR 80 with pt reporting that she wanted to try again. She stood and stepped 6 feet and again tried to get a standing BP and pt was too symptomatic so she sat and at that time sitting BP was 112/72 and HR 82. She then became nauseated and vomited. Afterwards she reported feeling better and was returned to supine. Overall she is moving at a contact guard to min assist level s/p a THR anterior approach POD 0. Anticipate steady gains with mobility allowing for return to home and HHPT for follow up. Her  with be her . .    Patient will benefit from skilled intervention to address the above impairments. Patients rehabilitation potential is considered to be Good  Factors which may influence rehabilitation potential include:   []         None noted  []         Mental ability/status  [x]         Medical condition  []         Home/family situation and support systems  []         Safety awareness  []         Pain tolerance/management  []         Other:      PLAN :  Recommendations and Planned Interventions:  [x]           Bed Mobility Training             []    Neuromuscular Re-Education  [x]           Transfer Training                   []    Orthotic/Prosthetic Training  [x]           Gait Training                         []    Modalities  [x]           Therapeutic Exercises           []    Edema Management/Control  [x]           Therapeutic Activities            [x]    Patient and Family Training/Education  []           Other (comment):    Frequency/Duration: Patient will be followed by physical therapy  twice daily to address goals. Discharge Recommendations: Home Health  Further Equipment Recommendations for Discharge: none     SUBJECTIVE:   Patient stated I feel like I'm going to faint.     OBJECTIVE DATA SUMMARY:   Consult received chart reviewed, pt cleared by nursing.    HISTORY:    Past Medical History:   Diagnosis Date    Depression     anxiety    Diabetes (HCC)     Type II    GERD (gastroesophageal reflux disease)     Headache(784.0)     migraines    Hx of colonic polyps     Menopause     PUD (peptic ulcer disease)      Past Surgical History:   Procedure Laterality Date    ENDOSCOPY, COLON, DIAGNOSTIC  3/4/11    ([place])    HX HEENT      T&A     Prior Level of Function/Home Situation: independent   Personal factors and/or comorbidities impacting plan of care: DM, had low blood sugar post op    Home Situation  Home Environment: Private residence  # Steps to Enter: 3  Hand Rails : Left  One/Two Story Residence: Two story  # of Interior Steps: 12  Interior Rails: Right  Living Alone: No  Support Systems: Spouse/Significant Other/Partner, Child(trinidad)  Patient Expects to be Discharged to[de-identified] Private residence  Current DME Used/Available at Home: jazmín Perez Cane, straight    EXAMINATION/PRESENTATION/DECISION MAKING:   Critical Behavior:      alert and oriented         Hearing: Auditory  Auditory Impairment: None  Skin:  Refer to MD and nursing notes  Edema: none noted  Range Of Motion:  AROM: Generally decreased, functional                       Strength:    Strength: Generally decreased, functional                    Tone & Sensation:                  Sensation: Intact               Coordination:     Vision:      Functional Mobility:  Bed Mobility:     Supine to Sit: Stand-by assistance  Sit to Supine: Minimum assistance     Transfers:  Sit to Stand: Contact guard assistance;Assist x2  Stand to Sit: Contact guard assistance;Assist x2                       Balance:   Sitting: Intact; Without support  Standing: Impaired  Standing - Static: Constant support;Good  Standing - Dynamic : Good  Ambulation/Gait Training:  Distance (ft): 6 Feet (ft)  Assistive Device: Gait belt;Walker, rolling  Ambulation - Level of Assistance: Contact guard assistance        Gait Abnormalities: Decreased step clearance     Left Side Weight Bearing: As tolerated  Base of Support: Narrowed     Speed/Risa: Slow  Step Length: Left shortened;Right shortened                     Stairs: Therapeutic Exercises: Ankle pumps    Functional Measure:  Timed up and go:    Timed Get Up And Go Test: 0 (pt uanble)     Timed Up and Go and G-code impairment scale:  Percentage of Impairment CH    0%   CI    1-19% CJ    20-39% CK    40-59% CL    60-79% CM    80-99% CN     100%   Timed   Score 0-56 10 11-12 13-14 15-16 17-18 19 20       < than 10 seconds=Normal  Greater then 13.5 seconds (in elderly)=Increased fall risk   Nelson Gray [place] M.  Predicting the probability for falls in community dwelling older adults using the Timed Up and Go Test. Phys Ther. 1255;11:281-411. G codes: In compliance with CMSs Claims Based Outcome Reporting, the following G-code set was chosen for this patient based on their primary functional limitation being treated: The outcome measure chosen to determine the severity of the functional limitation was the TUG with a score of 0 which was correlated with the impairment scale. ? Mobility - Walking and Moving Around:     - CURRENT STATUS: CN - 100% impaired, limited or restricted    - GOAL STATUS: CI - 1%-19% impaired, limited or restricted    - D/C STATUS:  ---------------To be determined---------------      Physical Therapy Evaluation Charge Determination   History Examination Presentation Decision-Making   MEDIUM  Complexity : 1-2 comorbidities / personal factors will impact the outcome/ POC  LOW Complexity : 1-2 Standardized tests and measures addressing body structure, function, activity limitation and / or participation in recreation  MEDIUM Complexity : Evolving with changing characteristics  LOW Complexity : FOTO score of       Based on the above components, the patient evaluation is determined to be of the following complexity level: LOW     Pain:  Pain Scale 1: Numeric (0 - 10)  Pain Intensity 1: 1  Pain Location 1: Hip  Pain Orientation 1: Left  Pain Description 1: Aching; Sore  Pain Intervention(s) 1: Rest;Ice  Activity Tolerance:   Refer to assessment above  Please refer to the flowsheet for vital signs taken during this treatment. After treatment:   []         Patient left in no apparent distress sitting up in chair  [x]         Patient left in no apparent distress in bed  [x]         Call bell left within reach  [x]         Nursing notified  [x]         Caregiver present  []         Bed alarm activated    COMMUNICATION/EDUCATION:   The patients plan of care was discussed with: Registered Nurse.   [x]         Fall prevention education was provided and the patient/caregiver indicated understanding. [x]         Patient/family have participated as able in goal setting and plan of care. [x]         Patient/family agree to work toward stated goals and plan of care. []         Patient understands intent and goals of therapy, but is neutral about his/her participation. []         Patient is unable to participate in goal setting and plan of care.     Thank you for this referral.  Chelle Yoon   Time Calculation: 22 mins

## 2018-04-16 NOTE — BRIEF OP NOTE
BRIEF OPERATIVE NOTE    Date of Procedure: 4/16/2018   Preoperative Diagnosis: DEGENERATIVE JOINT DISEASE OF LEFT HIP  Postoperative Diagnosis: DEGENERATIVE JOINT DISEASE OF LEFT HIP    Procedure(s):  LEFT TOTAL HIP ARTHROPLASTY ANTERIOR APPROACH (CHOICE ANESTHESIA)  Surgeon(s) and Role:     * Nicole Clay MD - Primary         Surgical Assistant: Akil Espino [facility]    Surgical Staff:  Circ-1: Mercedes Curiel RN  Circ-Relief: Cary Goldsmith RN  Physician Assistant: MINDY Meyer  Scrub Tech-1: Edyth Nageotte  Scrub RN-Relief: Yasemin Hanna RN  Surg Asst-1: Leotha Purdue  Surg Asst-2: Emanuel Brands  Surg Asst-Relief: Larene Orf  Event Time In   Incision Start 1159   Incision Close 1310     Anesthesia: spinal  Estimated Blood Loss: 100cc  Specimens: * No specimens in log *   Findings: DJD   Complications: none  Implants:   Implant Name Type Inv.  Item Serial No.  Lot No. LRB No. Used Action   CUP ACET SECTOR GRIPTION 52MM -- TI - SNA  CUP ACET SECTOR GRIPTION 52MM -- TI NA [facility] [id number] Left 1 Implanted   SCR ACET CANC PINN 6.5X25MM SS --  - SNA  SCR ACET CANC PINN 6.5X25MM SS --  NA [facility] [id number] Left 1 Implanted   LINER ACET PINN NEUT [id number] -- ALTRX - SNA  LINER ACET PINN NEUT [id number] -- ALTRX NA [facility] [id number] Left 1 Implanted   STEM FEM 12/14 TAPR 3 -- SUMMIT - SNA  STEM FEM 12/14 TAPR 3 -- SUMMIT NA [facility] [id number] Left 1 Implanted   HEAD FEM 36MM +1.5MM NK -- BIOLOX DELTA - SNA   HEAD FEM 36MM +1.5MM NK -- BIOLOX DELTA NA [facility] [id number] Left 1 Implanted

## 2018-04-16 NOTE — PROGRESS NOTES
Bedside and Verbal shift change report given to Ginger Dillard RN (oncoming nurse) by Ene Das RN (offgoing nurse). Report included the following information SBAR, Kardex, OR Summary, Procedure Summary, Intake/Output, MAR and Recent Results.

## 2018-04-16 NOTE — IP AVS SNAPSHOT
[address] [facility] [address] 
[phone] Patient: Claudell Romano MRN: [id number] PLN:[id number] A check nick indicates which time of day the medication should be taken. My Medications START taking these medications Instructions Each Dose to Equal  
 Morning Noon Evening Bedtime  
 meloxicam 7.5 mg tablet Commonly known as:  MOBIC Your last dose was: Your next dose is: Take 1 Tab by mouth daily. 7.5 mg  
    
   
   
   
  
 oxyCODONE IR 5 mg immediate release tablet Commonly known as:  Nae Briggs Your last dose was: Your next dose is: Take 1 Tab by mouth every three (3) hours as needed. Max Daily Amount: 40 mg.  
 5 mg  
    
   
   
   
  
 rivaroxaban 10 mg tablet Commonly known as:  Wisam Dick Your last dose was: Your next dose is: Take 1 Tab by mouth daily (with lunch). Indications: HIP SURGERY DEEP VEIN THROMBOSIS PREVENTION  
 10 mg CHANGE how you take these medications Instructions Each Dose to Equal  
 Morning Noon Evening Bedtime  
 fluticasone 50 mcg/actuation nasal spray Commonly known as:  Beti Castro What changed:  how much to take Your last dose was: Your next dose is: 2 Sprays by Both Nostrils route daily. 2 Spray CONTINUE taking these medications Instructions Each Dose to Equal  
 Morning Noon Evening Bedtime ACCU-CHEK KALYAN PLUS TEST STRP strip Generic drug:  glucose blood VI test strips Your last dose was: Your next dose is:    
   
   
 USE TO CHECK BLOOD SUGAR DAILY Gjihzvan-Acfi-Agdiqv-Hyalur Ac [id number] mg Cap Your last dose was: Your next dose is: Take  by mouth.  
     
   
   
   
  
 metFORMIN  mg tablet Commonly known as:  GLUCOPHAGE XR  
   
 Your last dose was: Your next dose is:    
   
   
 1,000 mg two (2) times a day. 1000 mg MULTIPLE VITAMINS 55 PLUS PO Your last dose was: Your next dose is: Take  by mouth.  
     
   
   
   
  
 rizatriptan 10 mg tablet Commonly known as:  Fer Munoz Your last dose was: Your next dose is: Take 1 Tab by mouth once as needed. May repeat in 2 hours if needed 10 mg  
    
   
   
   
  
  
STOP taking these medications   
 aspirin delayed-release 81 mg tablet Where to Get Your Medications Information on where to get these meds will be given to you by the nurse or doctor. ! Ask your nurse or doctor about these medications  
  meloxicam 7.5 mg tablet  
 oxyCODONE IR 5 mg immediate release tablet  
 rivaroxaban 10 mg tablet

## 2018-04-16 NOTE — PROGRESS NOTES
TRANSFER - IN REPORT:    Verbal report received from Carmen Garcia RN(name) on Nancy Faster  being received from [employer]) for routine post - op      Report consisted of patients Situation, Background, Assessment and   Recommendations(SBAR). Information from the following report(s) SBAR, Kardex, OR Summary, Procedure Summary, Intake/Output, MAR and Recent Results was reviewed with the receiving nurse. Opportunity for questions and clarification was provided. Assessment completed upon patients arrival to unit and care assumed.

## 2018-04-16 NOTE — ANESTHESIA PROCEDURE NOTES
Spinal Block    Start time: 4/16/2018 11:23 AM  End time: 4/16/2018 11:35 AM  Performed by: ALVARO Tripp  Authorized by: Martha Blackmon     Pre-procedure:   Indications: primary anesthetic  Preanesthetic Checklist: patient identified, risks and benefits discussed, anesthesia consent, site marked, patient being monitored and timeout performed    Timeout Time: 11:23          Spinal Block:   Patient Position:  Seated  Prep Region:  Lumbar  Prep: Betadine      Location:  L3-4  Technique:  Single shot  Local:  Lidocaine 1%      Needle:   Needle Type:  Pencil-tip  Needle Gauge:  25 G  Attempts:  1      Events: CSF confirmed, no blood with aspiration and no paresthesia        Assessment:  Insertion:  Uncomplicated  Patient tolerance:  Patient tolerated the procedure well with no immediate complications

## 2018-04-16 NOTE — PROGRESS NOTES
Primary Nurse Eleanor Sparrow RN and Dax Benjamin RN performed a dual skin assessment on this patient No impairment noted  Brent score is 21

## 2018-04-16 NOTE — PROGRESS NOTES
Physical Therapy:    Orders received and chart reviewed. Attempted PT evaluation, however patient's bilateral LEs remain numb and only active movement is great toe extension. Will hold and continue to follow.  Thank you    Homero Wu, PT, DPT

## 2018-04-17 ENCOUNTER — HOME HEALTH ADMISSION (OUTPATIENT)
Dept: HOME HEALTH SERVICES | Facility: HOME HEALTH | Age: 63
End: 2018-04-17
Payer: COMMERCIAL

## 2018-04-17 VITALS
OXYGEN SATURATION: 95 % | RESPIRATION RATE: 16 BRPM | DIASTOLIC BLOOD PRESSURE: 70 MMHG | HEIGHT: 63 IN | TEMPERATURE: 99.2 F | BODY MASS INDEX: 26.22 KG/M2 | WEIGHT: 148 LBS | HEART RATE: 85 BPM | SYSTOLIC BLOOD PRESSURE: 121 MMHG

## 2018-04-17 LAB
ANION GAP SERPL CALC-SCNC: 6 MMOL/L (ref 5–15)
BUN SERPL-MCNC: 9 MG/DL (ref 6–20)
BUN/CREAT SERPL: 10 (ref 12–20)
CALCIUM SERPL-MCNC: 8.4 MG/DL (ref 8.5–10.1)
CHLORIDE SERPL-SCNC: 111 MMOL/L (ref 97–108)
CO2 SERPL-SCNC: 25 MMOL/L (ref 21–32)
CREAT SERPL-MCNC: 0.88 MG/DL (ref 0.55–1.02)
GLUCOSE BLD STRIP.AUTO-MCNC: 147 MG/DL (ref 65–100)
GLUCOSE BLD STRIP.AUTO-MCNC: 153 MG/DL (ref 65–100)
GLUCOSE SERPL-MCNC: 154 MG/DL (ref 65–100)
HGB BLD-MCNC: 9.3 G/DL (ref 11.5–16)
POTASSIUM SERPL-SCNC: 4 MMOL/L (ref 3.5–5.1)
SERVICE CMNT-IMP: ABNORMAL
SERVICE CMNT-IMP: ABNORMAL
SODIUM SERPL-SCNC: 142 MMOL/L (ref 136–145)

## 2018-04-17 PROCEDURE — 85018 HEMOGLOBIN: CPT | Performed by: PHYSICIAN ASSISTANT

## 2018-04-17 PROCEDURE — 36415 COLL VENOUS BLD VENIPUNCTURE: CPT | Performed by: PHYSICIAN ASSISTANT

## 2018-04-17 PROCEDURE — 97165 OT EVAL LOW COMPLEX 30 MIN: CPT

## 2018-04-17 PROCEDURE — G8989 SELF CARE D/C STATUS: HCPCS

## 2018-04-17 PROCEDURE — 80048 BASIC METABOLIC PNL TOTAL CA: CPT | Performed by: PHYSICIAN ASSISTANT

## 2018-04-17 PROCEDURE — 74011250637 HC RX REV CODE- 250/637: Performed by: PHYSICIAN ASSISTANT

## 2018-04-17 PROCEDURE — 97535 SELF CARE MNGMENT TRAINING: CPT

## 2018-04-17 PROCEDURE — 97530 THERAPEUTIC ACTIVITIES: CPT | Performed by: PHYSICAL THERAPIST

## 2018-04-17 PROCEDURE — 74011250636 HC RX REV CODE- 250/636: Performed by: PHYSICIAN ASSISTANT

## 2018-04-17 PROCEDURE — G8988 SELF CARE GOAL STATUS: HCPCS

## 2018-04-17 PROCEDURE — 97116 GAIT TRAINING THERAPY: CPT | Performed by: PHYSICAL THERAPIST

## 2018-04-17 PROCEDURE — G8987 SELF CARE CURRENT STATUS: HCPCS

## 2018-04-17 PROCEDURE — 82962 GLUCOSE BLOOD TEST: CPT

## 2018-04-17 RX ORDER — DEXTROSE 50 % IN WATER (D50W) INTRAVENOUS SYRINGE
12.5-25 AS NEEDED
Status: DISCONTINUED | OUTPATIENT
Start: 2018-04-17 | End: 2018-04-17 | Stop reason: HOSPADM

## 2018-04-17 RX ORDER — INSULIN LISPRO 100 [IU]/ML
INJECTION, SOLUTION INTRAVENOUS; SUBCUTANEOUS
Status: DISCONTINUED | OUTPATIENT
Start: 2018-04-17 | End: 2018-04-17 | Stop reason: HOSPADM

## 2018-04-17 RX ORDER — MELOXICAM 7.5 MG/1
7.5 TABLET ORAL DAILY
Qty: 30 TAB | Refills: 0 | Status: SHIPPED | OUTPATIENT
Start: 2018-04-17 | End: 2018-06-26 | Stop reason: ALTCHOICE

## 2018-04-17 RX ORDER — MAGNESIUM SULFATE 100 %
4 CRYSTALS MISCELLANEOUS AS NEEDED
Status: DISCONTINUED | OUTPATIENT
Start: 2018-04-17 | End: 2018-04-17 | Stop reason: HOSPADM

## 2018-04-17 RX ORDER — OXYCODONE HYDROCHLORIDE 5 MG/1
5 TABLET ORAL
Qty: 40 TAB | Refills: 0 | Status: SHIPPED | OUTPATIENT
Start: 2018-04-17 | End: 2018-06-26 | Stop reason: ALTCHOICE

## 2018-04-17 RX ADMIN — CELECOXIB 200 MG: 200 CAPSULE ORAL at 08:49

## 2018-04-17 RX ADMIN — ACETAMINOPHEN 650 MG: 325 TABLET, FILM COATED ORAL at 12:37

## 2018-04-17 RX ADMIN — POLYETHYLENE GLYCOL 3350 17 G: 17 POWDER, FOR SOLUTION ORAL at 08:49

## 2018-04-17 RX ADMIN — Medication 2 G: at 04:32

## 2018-04-17 RX ADMIN — METFORMIN HYDROCHLORIDE 1000 MG: 500 TABLET, EXTENDED RELEASE ORAL at 07:37

## 2018-04-17 RX ADMIN — ACETAMINOPHEN 650 MG: 325 TABLET, FILM COATED ORAL at 04:31

## 2018-04-17 RX ADMIN — SODIUM CHLORIDE 125 ML/HR: 900 INJECTION, SOLUTION INTRAVENOUS at 04:43

## 2018-04-17 RX ADMIN — STANDARDIZED SENNA CONCENTRATE AND DOCUSATE SODIUM 1 TABLET: 8.6; 5 TABLET, FILM COATED ORAL at 08:49

## 2018-04-17 RX ADMIN — RIVAROXABAN 10 MG: 10 TABLET, FILM COATED ORAL at 12:22

## 2018-04-17 NOTE — PROGRESS NOTES
Ortho Daily Progress Note      Patient: Gerard Bardales                   MRN: [id number]  Sex: female  YOB: 1955           Age: 58 y.o.    1 Day Post-Op    Procedure(s): LEFT TOTAL HIP ARTHROPLASTY ANTERIOR APPROACH     Subjective: nausea last night, better this morning     Visit Vitals    /64 (BP 1 Location: Right arm, BP Patient Position: At rest)    Pulse 92    Temp 98.8 °F (37.1 °C)    Resp 14    Ht 5' 3\" (1.6 m)    Wt 67.1 kg (148 lb)    SpO2 96%    BMI 26.22 kg/m2        Lab Results:  HGB   Date/Time Value Ref Range Status   04/17/2018 04:39 AM 9.3 (L) 11.5 - 16.0 g/dL Final     INR   Date/Time Value Ref Range Status   03/22/2018 11:55 AM 0.9 0.9 - 1.1   Final     Comment:     A single therapeutic range for Vit K antagonists may not be optimal for all indications - see June, 2008 issue of Chest, American College of Chest Physicians Evidence-Based Clinical Practice Guidelines, 8th Edition.        Physical Exam:    DRESSING: Aquacel in place  SWELLING: mild  NEUROLOGICAL: intact  PULSE:yes   GENERAL: alert, cooperative, no distress, appears stated age  MOTION: Normal left hip ROM  DVT Exam: No evidence of DVT seen on physical exam.      Plan:    DVT prophylaxisXarelto  Weight bearing restrictionWBAT  Pain Control:stable, mild-to-moderate joint symptoms intermittently, reasonably well controlled by current meds   Dispo: Plan for discharge home with [address] [place] [address]  4/17/2018   8:16 AM      .

## 2018-04-17 NOTE — ANESTHESIA POSTPROCEDURE EVALUATION
Post-Anesthesia Evaluation and Assessment    Patient: Linard Favre MRN: [id number]  SSN: [id number]    YOB: 1955  Age: 58 y.o. Sex: female       Cardiovascular Function/Vital Signs  Visit Vitals    /70 (BP 1 Location: Right arm, BP Patient Position: At rest)    Pulse 85    Temp 37.3 °C (99.2 °F)    Resp 16    Ht 5' 3\" (1.6 m)    Wt 67.1 kg (148 lb)    SpO2 95%    BMI 26.22 kg/m2       Patient is status post spinal, MAC anesthesia for Procedure(s):  LEFT TOTAL HIP ARTHROPLASTY ANTERIOR APPROACH (CHOICE ANESTHESIA). Nausea/Vomiting: None    Postoperative hydration reviewed and adequate. Pain:  Pain Scale 1: Numeric (0 - 10) (04/17/18 1237)  Pain Intensity 1: 4 (04/17/18 1237)   Managed    Neurological Status:   Neuro (WDL): Exceptions to WDL (04/16/18 1428)  Neuro  Neurologic State: Alert (04/17/18 1100)  Orientation Level: Oriented X4 (04/17/18 1100)  Cognition: Appropriate for age attention/concentration (04/17/18 1100)  Speech: Appropriate for age;Clear (04/17/18 0837)  LUE Motor Response: Purposeful (04/17/18 0837)  LLE Motor Response: Purposeful (04/17/18 0837)  RUE Motor Response: Purposeful (04/17/18 0837)  RLE Motor Response: Purposeful (04/17/18 0837)   At baseline    Mental Status and Level of Consciousness: Arousable    Pulmonary Status:   O2 Device: Room air (04/16/18 2330)   Adequate oxygenation and airway patent    Complications related to anesthesia: None    Post-anesthesia assessment completed.  No concerns    Signed By: Jacinda Snowden MD     April 17, 2018

## 2018-04-17 NOTE — PROGRESS NOTES
Occupational Therapy EVALUATION/discharge  Patient: Celinda Riedel (5 y.o. female)  Date: 4/17/2018  Primary Diagnosis: DEGENERATIVE JOINT DISEASE OF LEFT HIP  Osteoarthritis of left hip  Procedure(s) (LRB):  LEFT TOTAL HIP ARTHROPLASTY ANTERIOR APPROACH (CHOICE ANESTHESIA) (Left) 1 Day Post-Op   Precautions:   Fall, WBAT    ASSESSMENT:   Based on the objective data described below, the patient presents with decreased independence with self care and functional mobility following L THR but she will have support and assistance from her  at home as needed. Pt was able to progress from EOB to bathroom and did well with all training and education. Discussed tub/shower tranfers with pt and  as well and deferred to HHPT for any questions that may arise once she discharges home. Pt does wish to have AE for home and did participate in pre-operative training and education prior to surgery. Recommend home with family support and assistance. Further skilled acute occupational therapy is not indicated at this time. Discharge Recommendations: None  Further Equipment Recommendations for Discharge: hip kit ordered and delivered to room from CMS      SUBJECTIVE:   Patient stated I am feeling very tired.     OBJECTIVE DATA SUMMARY:   HISTORY:   Past Medical History:   Diagnosis Date    Depression     anxiety    Diabetes ([facility] [address]     Type II    GERD (gastroesophageal reflux disease)     Headache(784.0)     migraines    Hx of colonic polyps     Menopause     PUD (peptic ulcer disease)      Past Surgical History:   Procedure Laterality Date    ENDOSCOPY, COLON, DIAGNOSTIC  3/4/11    ([place])    HX HEENT      T&A       Prior Level of Function/Environment/Context: pt was independent at home prior to surgery.    Occupations in which the patient is/was successful, what are the barriers preventing that success:   Performance Patterns (routines, roles, habits, and rituals):   Personal Interests and/or values: Expanded or extensive additional review of patient history:     Home Situation  Home Environment: Private residence  # Steps to Enter: 3  Hand Rails : Left  One/Two Story Residence: Two story  # of Interior Steps: 12  Interior Rails: Right  Living Alone: No  Support Systems: Spouse/Significant Other/Partner  Patient Expects to be Discharged to[de-identified] Private residence  Current DME Used/Available at Home: Armando Chandra, rolling  [x]  Right hand dominant   []  Left hand dominant    EXAMINATION OF PERFORMANCE DEFICITS:  Cognitive/Behavioral Status:  Neurologic State: Alert  Orientation Level: Oriented X4  Cognition: Appropriate for age attention/concentration  Perception: Appears intact  Perseveration: No perseveration noted  Safety/Judgement: Good awareness of safety precautions    Skin: see nursing notes    Edema: none noted    Hearing: Auditory  Auditory Impairment: None    Vision/Perceptual:                           Acuity: Within Defined Limits         Range of Motion:    AROM: Within functional limits  PROM: Within functional limits                      Strength:    Strength: Within functional limits                Coordination:  Coordination: Within functional limits  Fine Motor Skills-Upper: Right Intact; Left Intact    Gross Motor Skills-Upper: Right Intact; Left Intact    Tone & Sensation:    Tone: Normal  Sensation: Intact                      Balance:  Sitting: Intact  Standing: Intact; With support  Standing - Static: Constant support  Standing - Dynamic : Good    Functional Mobility and Transfers for ADLs:  Bed Mobility:  Supine to Sit:  (recieved sitting EOB)  Sit to Supine:  (remained sitting in chair)    Transfers:  Sit to Stand: Supervision  Stand to Sit: Supervision  Bed to Chair: Supervision  Toilet Transfer : Supervision    ADL Assessment:  Feeding: Supervision    Oral Facial Hygiene/Grooming: Supervision    Bathing: Moderate assistance    Upper Body Dressing: Supervision    Lower Body Dressing:  Moderate assistance    Toileting: Supervision                ADL Intervention and task modifications:    IADL training:   Discussed at length precautions with IADL tasks. Discussed body alignment and ensuring pt does not twist hips/knees to ensure proper body alignment. Discussed finger tip rule for daily activities and to use a reacher for all tasks that are out of reach. Pt discussed to avoid tasks such as sweeping, mopping, vacuuming, changing bed linens, carrying a laundry basket, reaching into a low oven, or cleaning showers and toilets. Pt verbalized understanding of instructions. Did encourage pt to stand at sink for grooming, washing dishes, and light meal preparations to increase overall standing tolerance and independence with all activities. Tub/Shower transfers:   Discussed technique for transferring into a tub/shower combos . Pt educated to step in with strong leg and to come out with operated leg to ensure safety with task. Pt instructed to have a family member or friend to assist pt when they attempt to get in the shower for the first time. Pt educated they can use the wall for steady balance as needed. Discussed technique to bring operated leg into the shower and provided demonstration for task. Pt reporting understanding of training. Adaptive Equipment Training:  Pt educated on use of AE to complete lower body dressing. Pt received skilled instruction on the following tasks: donning socks, donning pants, and donning shoes. Discussed dressing surgical LE first to maximize independence. Provided education for energy conservation and pain management for sit to stand to complete lower body dressing tasks. Pt demonstrated good understanding of training provided.                     Cognitive Retraining  Safety/Judgement: Good awareness of safety precautions    Functional Measure:  Barthel Index:    Bathin  Bladder: 10  Bowels: 10  Groomin  Dressin  Feeding: 10  Mobility: 5  Stairs: 5  Toilet Use: 5  Transfer (Bed to Chair and Back): 10  Total: 65       Barthel and G-code impairment scale:  Percentage of impairment CH  0% CI  1-19% CJ  20-39% CK  40-59% CL  60-79% CM  80-99% CN  100%   Barthel Score 0-100 100 99-80 79-60 59-40 20-39 1-19   0   Barthel Score 0-20 20 17-19 13-16 9-12 5-8 1-4 0      The Barthel ADL Index: Guidelines  1. The index should be used as a record of what a patient does, not as a record of what a patient could do. 2. The main aim is to establish degree of independence from any help, physical or verbal, however minor and for whatever reason. 3. The need for supervision renders the patient not independent. 4. A patient's performance should be established using the best available evidence. Asking the patient, friends/relatives and nurses are the usual sources, but direct observation and common sense are also important. However direct testing is not needed. 5. Usually the patient's performance over the preceding 24-48 hours is important, but occasionally longer periods will be relevant. 6. Middle categories imply that the patient supplies over 50 per cent of the effort. 7. Use of aids to be independent is allowed. Leandro Amaro., Barthel, DWayneW. (2056). Functional evaluation: the Barthel Index. [facility] (14)2. ALEJANDRO Black, Armando Golden., Matt Ware., [place], [address] [place] [address] (1999). Measuring the change indisability after inpatient rehabilitation; comparison of the responsiveness of the Barthel Index and Functional [place] Measure. Journal of Neurology, Neurosurgery, and Psychiatry, 66(4), 481-023. Alan Berger, N.J.A, JOSIAH Rodriguez.SOLEDAD, & Peña Michael, M.A. (2004.) Assessment of post-stroke quality of life in cost-effectiveness studies: The usefulness of the Barthel Index and the EuroQoL-5D. Quality of Life Research, 13, 277-54     G codes:   In compliance with CMSs Claims Based Outcome Reporting, the following G-code set was chosen for this patient based on their primary functional limitation being treated: The outcome measure chosen to determine the severity of the functional limitation was the Barthel Index with a score of 65/100 which was correlated with the impairment scale. ? Self Care:     - CURRENT STATUS: CJ - 20%-39% impaired, limited or restricted    - GOAL STATUS: CJ - 20%-39% impaired, limited or restricted    - D/C STATUS:  CJ - 20%-39% impaired, limited or restricted     Occupational Therapy Evaluation Charge Determination   History Examination Decision-Making   LOW Complexity : Brief history review  LOW Complexity : 1-3 performance deficits relating to physical, cognitive , or psychosocial skils that result in activity limitations and / or participation restrictions  LOW Complexity : No comorbidities that affect functional and no verbal or physical assistance needed to complete eval tasks       Based on the above components, the patient evaluation is determined to be of the following complexity level: LOW   Pain:  Pain Scale 1: Numeric (0 - 10)  Pain Intensity 1: 3  Pain Location 1: Hip  Pain Orientation 1: Left  Pain Description 1: Aching; Sore  Pain Intervention(s) 1: Ice  Activity Tolerance:   VSS throughout session. After treatment:   [x]  Patient left in no apparent distress sitting up in chair  []  Patient left in no apparent distress in bed  [x]  Call bell left within reach  [x]  Nursing notified  []  Caregiver present  []  Bed alarm activated    COMMUNICATION/EDUCATION:   Communication/Collaboration:  [x]      Home safety education was provided and the patient/caregiver indicated understanding. [x]      Patient/family have participated as able and agree with findings and recommendations. []      Patient is unable to participate in plan of care at this time.   Findings and recommendations were discussed with: Physical Therapist and Registered Nurse    Adriana Mims OT  Time Calculation: 33 mins

## 2018-04-17 NOTE — DISCHARGE INSTRUCTIONS
After Hospital Care Plan/Discharge Instructions   Anterior Approach Hip Replacement- Dr. Cecile Thakur    Patient Name  Juanito Del Cid  Date of procedure  4/16/2018    Procedure  Procedure(s):  LEFT TOTAL HIP ARTHROPLASTY ANTERIOR APPROACH (CHOICE ANESTHESIA)  Surgeon  Surgeon(s) and Role:     * Jose Eric MD - Primary  PCP: Toya Jerez MD  Date of discharge: No discharge date for patient encounter. Follow up appointments   Follow up with Dr. Cecile Thakur in 2 weeks. Call [phone] to make an appointment.  If home health has been arranged for you the agency will contact you to arrange dates/times for visits. Please call them if you do not hear from them within 24 hours after you are discharged    When to call your Orthopaedic Surgeon: Call [phone]. If you call after 5pm or on a weekend, the on call physician will be contacted   Increased hip pain, unrelieved pain or if you have difficulty or are unable to walk   Signs of infection-if your incision is red; continues to have drainage; drainage has a foul odor or if you have a persistent fever over 101 degrees   Signs of a blood clot in your leg-calf pain, tenderness, redness, swelling of lower leg    When to call your Primary Care Physician:   Concerns about medical conditions such as diabetes, high blood pressure, asthma, congestive heart failure  Call if blood sugars are elevated, persistent headache or dizziness, coughing or congestion, constipation or diarrhea, burning with urination, abnormal heart rate     When to call [id number] go to the nearest emergency room   Acute onset of chest pain, shortness of breath, difficulty breathing    Activity   Weight bearing as tolerated with walker or crutches.  Refer to pages 23-33 of your handbook for instructions and pictures   Complete your Home Exercise Program daily as instructed by your therapist.  Refer to pages 36-42 of your handbook for instructions and pictures   Get up every one hour and walk (except at night when sleeping)   AVOID sudden and extreme movement of your hip (surgical leg)   Do not drive or operate heavy machinery    Incision Care   The Aquacel (brown, waterproof) surgical dressing is to remain on your hip for 7 days. On the 7th day have someone gently peel the dressing off by carefully lifting the edge and stretching it slightly to break the adhesive seal   You may now leave your incision open to air. You will have absorbable sutures in your incision   If your Aquacel dressing comes loose/off before the 7th day, you may replace it with a dry sterile gauze dressing; change it daily. Once your incision is not draining, you may leave it open to air   You may take a shower with the Aquacel dressing in place. Once the Aquacel is removed, you may shower and get your incision wet but do not submerge your incision under water in a bath tub, hot tub or swimming pool for 6 weeks after surgery. Preventing blood clots  Take Xarelto at 12 noon daily as prescribed by Dr. Bashir Jameson    Pain management   Take pain medication as prescribed; decrease the amount you use as your pain lessens   Avoid alcoholic beverages while taking pain medication   Please be aware that many medications contain Tylenol. We do not want you to over medicate so please read the information below as a guide. Do not take more than 4 Grams of Tylenol in a 24 hour period. (There are 1000 milligrams in one Gram)  o Percocet contains 325 mg of Tylenol per tablet (do not take more than 12 tablets in 24 hours)  o Lortab contains 500 mg of Tylenol per tablet (do not take more than 8 tablets in 24 hours)  o Norco contains 325 mg of Tylenol per tablet (do not take more than 12 tablets in 24 hours).    You may place an ice bag on your hip for 15-20 minutes after exercising and as needed though out the day and night    Diet   Resume usual diet; drink plenty of fluids; eat foods high in fiber   You may want to take a stool softener (such as Senokot-S or Colace) to prevent constipation while you are taking pain medication. If constipation occurs, take a laxative (such as Dulcolax tablets, Milk of Magnesia, or a suppository)    [facility] Protocol (to be followed by Andrea Caba [place])    Nursing-per physicians order   Complete head to toe assessment, vital signs   Medication reconciliation   Review pain management   Manage chronic medical conditions    Physical Therapy-per physicians order    Weight bearing status:               Mobility Status:                Gait:                ADL status overall composite:                Physical Therapy   Assessment and evaluation-bed mobility; functional transfers (bed, chair, bathroom, stairs); ambulation with equipment, car transfers, shower transfers, safety and ability to get out of house in the event of an emergency   AVOID sudden and extreme movement of the hip (surgical leg)   Discuss pain management   Review how to do ADLs.   Refer to pages 43-47 of handbook    Home Exercise Program-refer to pages 36-42 of handbook

## 2018-04-17 NOTE — PROGRESS NOTES
Care Management Interventions  PCP Verified by CM: Yes Cynda Boast, MD)  Palliative Care Criteria Met (RRAT>21 & CHF Dx)?: No  Mode of Transport at Discharge: Other (see comment) (family)  Transition of Care Consult (CM Consult): [facility]  MyChart Signup: No  Discharge Durable Medical Equipment: No (patient was independent and working PTA. Patient owns walker)  Health Maintenance Reviewed: Yes  Physical Therapy Consult: Yes  Occupational Therapy Consult: Yes  Speech Therapy Consult: No  Current Support Network: Lives with Spouse, Own Home  Confirm Follow Up Transport: Family  Plan discussed with Pt/Family/Caregiver: Yes  Freedom of Choice Offered: Yes  Discharge Location  Discharge Placement: Home with home health  Reason for Admission: LEFT TOTAL HIP ARTHROPLASTY ANTERIOR APPROACH                    RRAT Score:  11                 Plan for utilizing home health:  [facility], referral sent via [facility], they have accepted case. CM updated AVS.                   Likelihood of Readmission:   Low. Transition of Care Plan:  Home with  and [facility].     JOANNE Alexis/CRM

## 2018-04-17 NOTE — PROGRESS NOTES
Problem: Mobility Impaired (Adult and Pediatric)  Goal: *Acute Goals and Plan of Care (Insert Text)  Physical Therapy Goals  Initiated 4/16/2018    1. Patient will move from supine to sit and sit to supine , scoot up and down and roll side to side in bed with independence within 4 days. 2. Patient will perform sit to stand with modified independence within 4 days. 3. Patient will ambulate with modified independence for 300 feet with the least restrictive device within 4 days. 4. Patient will ascend/descend 12 stairs with one handrail(s) with modified independence within 4 days. 5. Patient will perform hip home exercise program per protocol with independence within 4 days. physical Therapy TREATMENT  Patient: Brionna Pugh (89 y.o. female)  Date: 4/17/2018  Diagnosis: DEGENERATIVE JOINT DISEASE OF LEFT HIP  Osteoarthritis of left hip Osteoarthritis of left hip  Procedure(s) (LRB):  LEFT TOTAL HIP ARTHROPLASTY ANTERIOR APPROACH (CHOICE ANESTHESIA) (Left) 1 Day Post-Op  Precautions: WBAT, Fall  Chart, physical therapy assessment, plan of care and goals were reviewed. ASSESSMENT:  Patient is fairly apprehensive but doing well. Moves slowly. Ambulated 100 feet in 10 minutes with the RW and a step to gait. Steady. Used the bathroom with supervision and stood to wash her hands at the sink. She returned to the chair to rest for a few minutes and then we used the wheelchair to go to the steps. Able to ascend and descend 4 steps x 2 using the railings.  present and supportive. All questions answered. Patient is ok for disch per PT. She is in agreement and thought the steps were easier than walking.    Progression toward goals:  []      Improving appropriately and progressing toward goals  [x]      Improving slowly and progressing toward goals  []      Not making progress toward goals and plan of care will be adjusted     PLAN:  Patient continues to benefit from skilled intervention to address the above impairments. Continue treatment per established plan of care. Discharge Recommendations:  Home Health  Further Equipment Recommendations for Discharge:  rolling walker     SUBJECTIVE:   Patient stated This is easier than walking (steps).     OBJECTIVE DATA SUMMARY:   Critical Behavior:  Neurologic State: Alert, Appropriate for age  Orientation Level: Appropriate for age, Oriented X4  Cognition: Appropriate decision making, Appropriate for age attention/concentration, Appropriate safety awareness, Follows commands     Functional Mobility Training:  Bed Mobility:      Patient is up in the bedside chair and returned to the bedside chair. Transfers:  Sit to Stand: Independent; Additional time  Stand to Sit: Independent; Additional time                             Balance:  Sitting: Intact  Standing: Intact; With support  Standing - Static: Constant support;Good  Standing - Dynamic : Good  Ambulation/Gait Training:  Distance (ft): 100 Feet (ft)  Assistive Device: Walker, rolling;Gait belt  Ambulation - Level of Assistance: Supervision        Gait Abnormalities: Decreased step clearance; Step to gait; Antalgic     Left Side Weight Bearing: As tolerated  Base of Support: Narrowed; Shift to right     Speed/Risa: Pace decreased (<100 feet/min)  Step Length: Right shortened;Left shortened                  Slow steady step to gait with a RW.   Stairs:  Number of Stairs Trained: 8  Stairs - Level of Assistance: Supervision  Rail Use: Both    Therapeutic Exercises:   SUPINE  EXERCISES   Sets   Reps   Active Active Assist   Passive Self ROM   Comments   Ankle Pumps 1 10 [x]                                        []                                        []                                        []                                           Quad Sets   []                                        []                                        []                                        [] Heel Slides   []                                        []                                        []                                        []                                           Hip Abduction   []                                        []                                        []                                        []                                           Glut Sets   []                                        []                                        []                                        []                                           SAQ in sitting 1 10 [x]                                        []                                        []                                        []                                              []                                        []                                        []                                        []                                             STANDING  EXERCISES   Sets   Reps   Active Active Assist   Passive Self ROM   Comments   Heel Raises   []                                        []                                        []                                        []                                           Hip Abduction   []                                        []                                        []                                        []                                              []                                        []                                        []                                        []                                              []                                        []                                        []                                        []                                             Pain:  Pain Scale 1: Numeric (0 - 10)  Pain Intensity 1: 3  Pain Location 1: Hip  Pain Orientation 1: Left  Pain Description 1: Aching; Sore  Pain Intervention(s) 1: Ice  Activity Tolerance:   Good.   Please refer to the flowsheet for vital signs taken during this treatment.   After treatment:   [x] Patient left in no apparent distress sitting up in chair  [] Patient left in no apparent distress in bed  [x] Call bell left within reach  [x] Nursing notified  [x] Caregiver present  [] Bed alarm activated    COMMUNICATION/COLLABORATION:   The patients plan of care was discussed with: Registered Nurse and Certified Nursing Assistant/Patient Care Technician    Pasha Bradford, PT   Time Calculation: 45 mins

## 2018-04-17 NOTE — PROGRESS NOTES
04/17/18 8504   Activity and Safety   Activity Level Up with Assistance     Ambulated in the room without distress

## 2018-04-18 ENCOUNTER — HOME CARE VISIT (OUTPATIENT)
Dept: SCHEDULING | Facility: HOME HEALTH | Age: 63
End: 2018-04-18
Payer: COMMERCIAL

## 2018-04-18 VITALS
HEART RATE: 92 BPM | WEIGHT: 143 LBS | SYSTOLIC BLOOD PRESSURE: 120 MMHG | TEMPERATURE: 98.6 F | DIASTOLIC BLOOD PRESSURE: 74 MMHG | RESPIRATION RATE: 16 BRPM | HEIGHT: 63 IN | BODY MASS INDEX: 25.34 KG/M2 | OXYGEN SATURATION: 99 %

## 2018-04-18 VITALS — RESPIRATION RATE: 16 BRPM

## 2018-04-18 PROCEDURE — G0151 HHCP-SERV OF PT,EA 15 MIN: HCPCS

## 2018-04-18 PROCEDURE — G0299 HHS/HOSPICE OF RN EA 15 MIN: HCPCS

## 2018-04-20 ENCOUNTER — HOME CARE VISIT (OUTPATIENT)
Dept: SCHEDULING | Facility: HOME HEALTH | Age: 63
End: 2018-04-20
Payer: COMMERCIAL

## 2018-04-20 VITALS
OXYGEN SATURATION: 98 % | TEMPERATURE: 98 F | SYSTOLIC BLOOD PRESSURE: 118 MMHG | DIASTOLIC BLOOD PRESSURE: 60 MMHG | RESPIRATION RATE: 16 BRPM | HEART RATE: 84 BPM

## 2018-04-20 PROCEDURE — G0151 HHCP-SERV OF PT,EA 15 MIN: HCPCS

## 2018-04-23 ENCOUNTER — HOME CARE VISIT (OUTPATIENT)
Dept: SCHEDULING | Facility: HOME HEALTH | Age: 63
End: 2018-04-23
Payer: COMMERCIAL

## 2018-04-23 VITALS
OXYGEN SATURATION: 97 % | DIASTOLIC BLOOD PRESSURE: 75 MMHG | RESPIRATION RATE: 16 BRPM | TEMPERATURE: 98 F | SYSTOLIC BLOOD PRESSURE: 120 MMHG | HEART RATE: 87 BPM

## 2018-04-23 PROCEDURE — G0151 HHCP-SERV OF PT,EA 15 MIN: HCPCS

## 2018-04-23 NOTE — DISCHARGE SUMMARY
Orthopedic Service Discharge Summary    Patient ID:  Nestor Sanchez  [id number]  female  58 y.o.  1955    Admit date: 2018    Discharge date and time: 2018  1:00 PM     Admitting Physician: Milton Nuñez MD     Discharge Physician: Milton Nuñez MD    Consulting Physician(s): Treatment Team: Utilization Review: Gala Mark RN; Care Manager: Awa Turner    Date of Surgery: 2018     Preoperative Diagnosis:  DEGENERATIVE JOINT DISEASE OF LEFT HIP    Postoperative Diagnosis: DEGENERATIVE JOINT DISEASE OF LEFT HIP    Procedure(s): Procedure(s):  LEFT TOTAL HIP ARTHROPLASTY ANTERIOR APPROACH (CHOICE ANESTHESIA)    Surgeon: Christinia Bloch) and Role:     Amalia Treadwell MD - Primary      Anesthesia:  Other    Preoperative Medical Clearance:                           HPI:  Pt is a 58 y.o. female who has a history of DEGENERATIVE JOINT DISEASE OF LEFT HIP  Osteoarthritis of left hip  with pain and limitations of activities of daily living who presents at this time for a left THR following the failure of conservative management. PMH:   Past Medical History:   Diagnosis Date    Depression     anxiety    Diabetes ([address]     Type II    GERD (gastroesophageal reflux disease)     Headache(784.0)     migraines    Hx of colonic polyps     Menopause     PUD (peptic ulcer disease)        Medications upon admission :   Prior to Admission Medications   Prescriptions Last Dose Informant Patient Reported? Taking? ACCU-CHEK KALYAN PLUS TEST STRP strip 4/15/2018 at Unknown time  No Yes   Sig: USE TO CHECK BLOOD SUGAR DAILY   MULTIVITAMIN WITH MINERALS (MULTIPLE VITAMINS 55 PLUS PO) 2018 at Unknown time  Yes Yes   Sig: Take  by mouth. fluticasone (FLONASE) 50 mcg/actuation nasal spray 2018 at Unknown time  No Yes   Si Sprays by Both Nostrils route daily. Patient taking differently: 1 Spray by Both Nostrils route daily.    metFORMIN ER (GLUCOPHAGE XR) 500 mg tablet 4/15/2018 at Unknown time  Yes Yes   Si,000 mg two (2) times a day. rizatriptan (MAXALT) 10 mg tablet 2018 at Unknown time  No Yes   Sig: Take 1 Tab by mouth once as needed. May repeat in 2 hours if needed      Facility-Administered Medications: None        Allergies: Allergies   Allergen Reactions    Codeine Unknown (comments)     difficulty breathing        Hospital Course: The patient underwent surgery. Complications:  None; patient tolerated the procedure well. Was taken to the PACU in stable condition and then transferred to the Orthopedics floor. Condition on discharge:  Stable    Postoperative Pain Management:  Oxycodone,     DVT Prophylaxis: Xarelto Sequential Compression Devices    Postoperative transfusions:     none Banked PRBCs     Post Op complications: none    Hemoglobin at discharge:    Lab Results   Component Value Date/Time    HGB 9.3 (L) 2018 04:39 AM    INR 0.9 2018 11:55 AM       Dressing was changed on POD # 2. Incision - clean, dry and intact. No significant erythema or swelling. Neurovascular exam within normal limits. Wound appears to be healing without any evidence of infection. Pt had a HVAC drain the was removed on day 2. Physical Therapy started on the day following surgery and progressed to ambulation with the aid of a rolling walker for distances of **50 feet with modified independence. Range of motion  limited by pain. At the time of discharge, able to go up and down stairs and had understanding of precautions needed following surgery. Discharged to: Home. Discharge instructions:  -See Full Summary of discharge instructions attached  -Anticoagulate with Xarelto  -Resume pre hospital diet            -Resume home medications   Discharge Medication List as of 2018 10:51 AM      START taking these medications    Details   rivaroxaban (XARELTO) 10 mg tablet Take 1 Tab by mouth daily (with lunch).  Indications: HIP SURGERY DEEP VEIN THROMBOSIS PREVENTION, Print, Disp-30 Tab, R-0      oxyCODONE IR (ROXICODONE) 5 mg immediate release tablet Take 1 Tab by mouth every three (3) hours as needed. Max Daily Amount: 40 mg., Print, Disp-40 Tab, R-0      meloxicam (MOBIC) 7.5 mg tablet Take 1 Tab by mouth daily. , Print, Disp-30 Tab, R-0         CONTINUE these medications which have NOT CHANGED    Details   metFORMIN ER (GLUCOPHAGE XR) 500 mg tablet 1,000 mg two (2) times a day., Historical Med      MULTIVITAMIN WITH MINERALS (MULTIPLE VITAMINS 55 PLUS PO) Take  by mouth., Historical Med      rizatriptan (MAXALT) 10 mg tablet Take 1 Tab by mouth once as needed. May repeat in 2 hours if needed, Normal, Disp-9 Tab, R-5      ACCU-CHEK KALYAN PLUS TEST STRP strip USE TO CHECK BLOOD SUGAR DAILY, Normal, Disp-100 Strip, R-2      fluticasone (FLONASE) 50 mcg/actuation nasal spray 2 Sprays by Both Nostrils route daily. , Normal, Disp-1 Bottle, R-11      Dbvhwcbk-Fqyo-Pacsol-Hyalur Ac 813-254-35-2 mg Cap Take  by mouth.  , Historical Med         STOP taking these medications       aspirin delayed-release 81 mg tablet Comments:   Reason for Stopping:            per medical continuation form  -Discharge activity: Activity as tolerated  -Ambulate with Walkers, Type:  Walker, appropriate total joint protocol  -Wound Care Keep wound clean and dry, Reinforce dressing PRN, Ice to area for comfort and As directed  -Follow up in office in 2 weeks      Signed:  Rudolph Seip, MD  4/23/2018  7:39 AM        No att. providers found

## 2018-06-26 ENCOUNTER — OFFICE VISIT (OUTPATIENT)
Dept: INTERNAL MEDICINE CLINIC | Age: 63
End: 2018-06-26

## 2018-06-26 VITALS
BODY MASS INDEX: 26.4 KG/M2 | WEIGHT: 149 LBS | HEIGHT: 63 IN | OXYGEN SATURATION: 99 % | HEART RATE: 72 BPM | TEMPERATURE: 97.6 F | RESPIRATION RATE: 17 BRPM | SYSTOLIC BLOOD PRESSURE: 137 MMHG | DIASTOLIC BLOOD PRESSURE: 79 MMHG

## 2018-06-26 DIAGNOSIS — E11.9 TYPE 2 DIABETES MELLITUS WITHOUT COMPLICATION, WITHOUT LONG-TERM CURRENT USE OF INSULIN (HCC): ICD-10-CM

## 2018-06-26 DIAGNOSIS — H61.23 BILATERAL IMPACTED CERUMEN: ICD-10-CM

## 2018-06-26 DIAGNOSIS — Z00.00 ROUTINE GENERAL MEDICAL EXAMINATION AT A HEALTH CARE FACILITY: Primary | ICD-10-CM

## 2018-06-26 RX ORDER — ASPIRIN 81 MG/1
TABLET ORAL DAILY
COMMUNITY
End: 2019-08-09 | Stop reason: ALTCHOICE

## 2018-06-26 NOTE — PROGRESS NOTES
Chief Complaint   Patient presents with    Complete Physical     pain in hips     1. Have you been to the ER, urgent care clinic since your last visit? Hospitalized since your last visit? No    2. Have you seen or consulted any other health care providers outside of the [address] [place] [address] since your last visit? Include any pap smears or colon screening.  No

## 2018-06-26 NOTE — PROGRESS NOTES
Subjective:   61 y.o. female for Well Woman Check. Her gyne and breast care is done elsewhere by her Ob-Gyne physician. Patient Active Problem List    Diagnosis Date Noted    Osteoarthritis of left hip 04/16/2018    Type 2 diabetes mellitus without complication ([facility] 89/02/4081    Migraine headache 12/14/2012    PUD (peptic ulcer disease) 09/21/2012    Diabetes mellitus ([facility] 03/28/2012    Allergic rhinitis 03/28/2012     Current Outpatient Prescriptions   Medication Sig Dispense Refill    aspirin delayed-release 81 mg tablet Take  by mouth daily.  metFORMIN (GLUCOPHAGE) 500 mg tablet Take 1,000 mg by mouth two (2) times a day.  acetaminophen (TYLENOL) 500 mg tablet Take 500 mg by mouth every six (6) hours as needed for Pain.  Cweyapws-Ayog-Wpbysf-Hyalur Ac 231-221-90-2 mg cap Take 1 Tab by mouth daily.  MULTIVITAMIN WITH MINERALS (MULTIPLE VITAMINS 55 PLUS PO) Take 1 Tab by mouth daily.  rizatriptan (MAXALT) 10 mg tablet Take 1 Tab by mouth once as needed. May repeat in 2 hours if needed (Patient taking differently: Take 1 Tab by mouth once as needed for Migraine. May repeat in 2 hours if needed) 9 Tab 5    ACCU-CHEK KALYAN PLUS TEST STRP strip USE TO CHECK BLOOD SUGAR DAILY 100 Strip 2    fluticasone (FLONASE) 50 mcg/actuation nasal spray 2 Sprays by Both Nostrils route daily. (Patient taking differently: 1 Spray by Both Nostrils route daily. ) 1 Bottle 11     Allergies   Allergen Reactions    Codeine Unknown (comments)     difficulty breathing     Past Medical History:   Diagnosis Date    Depression     anxiety    Diabetes ([facility]     Type II    GERD (gastroesophageal reflux disease)     Headache(784.0)     migraines    Hx of colonic polyps     Menopause     PUD (peptic ulcer disease)      Past Surgical History:   Procedure Laterality Date    ENDOSCOPY, COLON, DIAGNOSTIC  3/4/11    ([place])    HX HEENT      T&A    HX HIP REPLACEMENT Left 04/2018     Family History Problem Relation Age of Onset    Cancer Father      lung (+tob)    Coronary Artery Disease Father     Cancer Mother      vaginal/cervical    SLE Mother     Coronary Artery Disease Sister      Social History   Substance Use Topics    Smoking status: Former Smoker     Packs/day: 20.00     Types: Cigarettes     Quit date: 3/22/1995    Smokeless tobacco: Never Used    Alcohol use No        Lab Results  Component Value Date/Time   WBC 5.4 06/20/2018 08:42 AM   HGB 11.2 06/20/2018 08:42 AM   HCT 34.5 06/20/2018 08:42 AM   PLATELET 609 90/73/8767 08:42 AM   MCV 87 06/20/2018 08:42 AM     Lab Results  Component Value Date/Time   Hemoglobin A1c 6.7 (H) 06/20/2018 08:42 AM   Hemoglobin A1c 6.7 (H) 03/16/2018 12:23 PM   Hemoglobin A1c 7.4 (H) 05/28/2015 09:37 AM   Hemoglobin A1c, External 7.1 04/22/2016   Hemoglobin A1c, External 6.8 09/11/2015   Glucose 137 (H) 06/20/2018 08:42 AM   Glucose (POC) 153 (H) 04/17/2018 12:10 PM   Microalb/Creat ratio (ug/mg creat.) 11.3 03/16/2018 12:23 PM   LDL, calculated 86 06/20/2018 08:42 AM   Creatinine 0.75 06/20/2018 08:42 AM      Lab Results  Component Value Date/Time   Cholesterol, total 184 06/20/2018 08:42 AM   HDL Cholesterol 83 06/20/2018 08:42 AM   LDL, calculated 86 06/20/2018 08:42 AM   LDL-C, External 88 09/11/2015   Triglyceride 76 06/20/2018 08:42 AM     Lab Results  Component Value Date/Time   ALT (SGPT) 15 06/20/2018 08:42 AM   AST (SGOT) 17 06/20/2018 08:42 AM   Alk.  phosphatase 75 06/20/2018 08:42 AM   Bilirubin, total 0.3 06/20/2018 08:42 AM   Albumin 4.5 06/20/2018 08:42 AM   Protein, total 6.6 06/20/2018 08:42 AM   INR 0.9 03/22/2018 11:55 AM   Prothrombin time 9.7 03/22/2018 11:55 AM   PLATELET 003 28/49/5829 08:42 AM       Lab Results  Component Value Date/Time   GFR est non-AA 85 06/20/2018 08:42 AM   GFR est AA 98 06/20/2018 08:42 AM   Creatinine 0.75 06/20/2018 08:42 AM   BUN 12 06/20/2018 08:42 AM   Sodium 142 06/20/2018 08:42 AM   Potassium 4.5 06/20/2018 08:42 AM   Chloride 104 06/20/2018 08:42 AM   CO2 24 06/20/2018 08:42 AM     Lab Results  Component Value Date/Time   TSH 1.640 06/20/2018 08:42 AM         Specific concerns today:   L THR replacement in mid April. Since that time still with pain in left hip and increasing pain in right groin. Has discussed with Dr. Gregoria Fontenot. Stopped exercises secondary to pain. Has been walking but strained muscles 4 weeks post surgery. Could not left left leg off floor when sitting. Saw Dr. Gregoria Fontenot who told her to rest more. Slowly improved but left leg is still weak. Blood sugars have been elevated. Average over 30 days is 137. Am numbers have crept up. .Not exercising - see above. Sticking with DM diet. No changes in vision, numbness in feet, increased thirst or urination. A1C was stable at 6.7%    Review of Systems  Constitutional: negative  Eyes: negative  Ears, nose, mouth, throat, and face: negative  Respiratory: negative  Cardiovascular: negative  Gastrointestinal: negative except for rare indigestion. Genitourinary:negative  Integument/breast: negative  Hematologic/lymphatic: negative  Musculoskeletal:negative except for hip pain as above  Neurological: negative except for migraines- occ and controlled with Maxalt. Behavioral/Psych: negative  Endocrine: negative  Allergic/Immunologic: negative    Objective:   Blood pressure 137/79, pulse 72, temperature 97.6 °F (36.4 °C), temperature source Oral, resp. rate 17, height 5' 3\" (1.6 m), weight 149 lb (67.6 kg), SpO2 99 %.    Physical Examination:   General appearance - alert, well appearing, and in no distress and oriented to person, place, and time  Mental status - alert, oriented to person, place, and time, normal mood, behavior, speech, dress, motor activity, and thought processes  Eyes - pupils equal and reactive, extraocular eye movements intact  Ears - bilateral cerumen impaction  Nose - normal and patent, no erythema, discharge or polyps  Mouth - mucous membranes moist, pharynx normal without lesions  Neck - supple, no significant adenopathy, carotids upstroke normal bilaterally, no bruits, thyroid exam: thyroid is normal in size without nodules or tenderness  Lymphatics - no palpable lymphadenopathy, no hepatosplenomegaly  Chest - clear to auscultation, no wheezes, rales or rhonchi, symmetric air entry  Heart - normal rate, regular rhythm, normal S1, S2, no murmurs, rubs, clicks or gallops  Abdomen - soft, nontender, nondistended, no masses or organomegaly  bowel sounds normal  Breasts - breasts appear normal, no suspicious masses, no skin or nipple changes or axillary nodes  Back exam - full range of motion, no tenderness, palpable spasm or pain on motion  Neurological - alert, oriented, normal speech, no focal findings or movement disorder noted  Musculoskeletal - decreased rom with pain bilat hips. Extremities - peripheral pulses normal, no pedal edema, no clubbing or cyanosis  Skin - normal coloration and turgor, no rashes, no suspicious skin lesions noted   FOOT - nml in appearance without callusing, lesions or ulcerations. nml sensation to light touch and filament testing. 2+ dp and pt pulses    Labs reviewed in chart. Assessment/Plan:   61yo female  Diabetes - well controlled. Continue same medications. Continue to work on diet and exercise for BS control. Foot exam completed today. UTD eye exam  L THR and right hip OA _ encouraged her to f/u with Dr. Josue Martinez and restart PT. Migraine headaches - well controlled, cont same  bilat cerumen impaction - waxed cleared in office today  HM - shingrix rx given  call if any problems  Orders Placed This Encounter    aspirin delayed-release 81 mg tablet    varicella-zoster recombinant, PF, (SHINGRIX, PF,) 50 mcg/0.5 mL susr injection     Follow-up Disposition:  Return in about 3 months (around 9/26/2018) for diabetes.

## 2018-06-26 NOTE — MR AVS SNAPSHOT
[address] [place] [address] [address] [facility] 
[phone] Patient: Kay Shay MRN: [id number] WBG:[birth year] Visit Information Date & Time Provider Department Dept. Phone Encounter #  
 6/26/2018 10:40 AM Diya Benitez MD [facility] [phone] [id number] Follow-up Instructions Return in about 3 months (around 9/26/2018) for diabetes. Upcoming Health Maintenance Date Due  
 PAP AKA CERVICAL CYTOLOGY 1/1/2017 EYE EXAM RETINAL OR DILATED Q1 11/14/2017 FOOT EXAM Q1 2/14/2018 Influenza Age 5 to Adult 8/1/2018 DTaP/Tdap/Td series (2 - Td) 11/5/2018 HEMOGLOBIN A1C Q6M 12/20/2018 MICROALBUMIN Q1 3/16/2019 LIPID PANEL Q1 6/20/2019 BREAST CANCER SCRN MAMMOGRAM 12/15/2019 COLONOSCOPY 7/8/2021 Allergies as of 6/26/2018  Review Complete On: 6/26/2018 By: Diya Benitez MD  
  
 Severity Noted Reaction Type Reactions Codeine  12/19/2011    Unknown (comments) difficulty breathing Current Immunizations  Reviewed on 2/14/2017 Name Date Influenza Vaccine 11/1/2016, 11/5/2014, 11/29/2013 Influenza Vaccine Whole 10/24/2011, 11/1/2008 TDAP Vaccine 11/5/2008 ZZZ-RETIRED (DO NOT USE) Pneumococcal Vaccine (Unspecified Type) 8/17/2006 Zoster Vaccine, Live 5/13/2016 Not reviewed this visit You Were Diagnosed With   
  
 Codes Comments Routine general medical examination at a health care facility    -  Primary ICD-10-CM: Z00.00 ICD-9-CM: V70.0 Type 2 diabetes mellitus without complication, without long-term current use of insulin (HCC)     ICD-10-CM: E11.9 ICD-9-CM: 250.00 Bilateral impacted cerumen     ICD-10-CM: H61.23 
ICD-9-CM: 380.4 Vitals BP Pulse Temp Resp Height(growth percentile) Weight(growth percentile)  
 137/79 72 97.6 °F (36.4 °C) (Oral) 17 5' 3\" (1.6 m) 149 lb (67.6 kg) SpO2 BMI OB Status Smoking Status 99% 26.39 kg/m2 Postmenopausal Former Smoker Vitals History BMI and BSA Data Body Mass Index Body Surface Area  
 26.39 kg/m 2 1.73 m 2 Preferred Pharmacy Pharmacy Name Phone [facility] [address] [facility] [phone] Your Updated Medication List  
  
   
This list is accurate as of 18 11:55 AM.  Always use your most recent med list.  
  
  
  
  
 ACCU-CHEK KALYAN PLUS TEST STRP strip Generic drug:  glucose blood VI test strips USE TO CHECK BLOOD SUGAR DAILY  
  
 acetaminophen 500 mg tablet Commonly known as:  TYLENOL Take 500 mg by mouth every six (6) hours as needed for Pain. aspirin delayed-release 81 mg tablet Take  by mouth daily. fluticasone 50 mcg/actuation nasal spray Commonly known as:  Marine Knack 2 Sprays by Both Nostrils route daily. Ygzyvlsk-Juoh-Rqnehj-Hyalur Ac 967-730-17-2 mg Cap Take 1 Tab by mouth daily. metFORMIN 500 mg tablet Commonly known as:  GLUCOPHAGE Take 1,000 mg by mouth two (2) times a day. MULTIPLE VITAMINS 55 PLUS PO Take 1 Tab by mouth daily. rizatriptan 10 mg tablet Commonly known as:  Yusef Bur Take 1 Tab by mouth once as needed. May repeat in 2 hours if needed  
  
 varicella-zoster recombinant (PF) 50 mcg/0.5 mL Susr injection Commonly known as:  SHINGRIX (PF)  
0.5 mL by IntraMUSCular route once for 1 dose. Repeat x1 in 2-6 months Prescriptions Printed Refills  
 varicella-zoster recombinant, PF, (SHINGRIX, PF,) 50 mcg/0.5 mL susr injection 1 Si.5 mL by IntraMUSCular route once for 1 dose. Repeat x1 in 2-6 months Class: Print Route: IntraMUSCular We Performed the Following  DIABETES FOOT EXAM [HM7 Custom] REMOVE IMPACTED EAR WAX [44694 CPT(R)] Follow-up Instructions Return in about 3 months (around 2018) for diabetes. Please provide this summary of care documentation to your next provider. Your primary care clinician is listed as ERIC LUO. If you have any questions after today's visit, please call [phone].

## 2018-09-25 ENCOUNTER — TELEPHONE (OUTPATIENT)
Dept: INTERNAL MEDICINE CLINIC | Age: 63
End: 2018-09-25

## 2018-09-25 DIAGNOSIS — E11.9 TYPE 2 DIABETES MELLITUS WITHOUT COMPLICATION, WITHOUT LONG-TERM CURRENT USE OF INSULIN (HCC): Primary | ICD-10-CM

## 2018-09-25 NOTE — TELEPHONE ENCOUNTER
Pt would like to have her lab work done prior to her appt. Oct. 1. Pt is requesting that the order be sent to [facility] at [facility].  Please call pt to confirm its done.              FROM [facility]

## 2018-10-01 ENCOUNTER — OFFICE VISIT (OUTPATIENT)
Dept: INTERNAL MEDICINE CLINIC | Age: 63
End: 2018-10-01

## 2018-10-01 VITALS
RESPIRATION RATE: 18 BRPM | HEIGHT: 63 IN | SYSTOLIC BLOOD PRESSURE: 125 MMHG | WEIGHT: 148.8 LBS | TEMPERATURE: 98.5 F | DIASTOLIC BLOOD PRESSURE: 83 MMHG | BODY MASS INDEX: 26.36 KG/M2 | HEART RATE: 78 BPM | OXYGEN SATURATION: 96 %

## 2018-10-01 DIAGNOSIS — E66.3 OVERWEIGHT (BMI 25.0-29.9): ICD-10-CM

## 2018-10-01 DIAGNOSIS — G43.009 MIGRAINE WITHOUT AURA AND WITHOUT STATUS MIGRAINOSUS, NOT INTRACTABLE: ICD-10-CM

## 2018-10-01 DIAGNOSIS — R06.83 SNORING: ICD-10-CM

## 2018-10-01 DIAGNOSIS — E11.9 TYPE 2 DIABETES MELLITUS WITHOUT COMPLICATION, WITHOUT LONG-TERM CURRENT USE OF INSULIN (HCC): Primary | ICD-10-CM

## 2018-10-01 DIAGNOSIS — R53.82 CHRONIC FATIGUE: ICD-10-CM

## 2018-10-01 NOTE — PROGRESS NOTES
1. Have you been to the ER, urgent care clinic since your last visit? Hospitalized since your last visit? No 
 
2. Have you seen or consulted any other health care providers outside of the [address] [place] [address] since your last visit? Include any pap smears or colon screening. No  
Chief Complaint Patient presents with  Diabetes  
  follow up Not fasting Abuse Screening Questionnaire 3/16/2018 Do you ever feel afraid of your partner? Princess Miner Are you in a relationship with someone who physically or mentally threatens you? Princess Miner Is it safe for you to go home? Y  
 
PHQ over the last two weeks 10/1/2018 Little interest or pleasure in doing things Not at all Feeling down, depressed, irritable, or hopeless Not at all Total Score PHQ 2 0

## 2018-10-01 NOTE — PROGRESS NOTES
Subjective:  
 
Oleg French is a 61 y.o. female seen for follow up of diabetes. She also has migraine headaches. Diabetic Review of Systems - medication compliance: compliant all of the time, diabetic diet compliance: compliant most of the time but  and her have a plan to eat healthier, home glucose monitorin day average is 138, further diabetic ROS: no polyuria or polydipsia, no chest pain, dyspnea or TIA's, no numbness, tingling or pain in extremities, no unusual visual symptoms, last eye exam approximately 11 months ago. Other symptoms and concerns:  
Left hip pain has improved from L THR. Completed PT and is now walking regularly. . 
Increased stress with increased fatigue. Feels sleep is good - up several times to urinate but white noise has helped. Cold al the time. Weight is up a couple of times. Some diarrrhea but no constipation. Migraines are controlled. Took Rizatriptan 2 times in the last 3 weeks. Will receive flu shot vaccine through work. Current Outpatient Prescriptions Medication Sig Dispense Refill  aspirin delayed-release 81 mg tablet Take  by mouth daily.  metFORMIN (GLUCOPHAGE) 500 mg tablet Take 1,000 mg by mouth two (2) times a day.  acetaminophen (TYLENOL) 500 mg tablet Take 500 mg by mouth every six (6) hours as needed for Pain.  Mznuasms-Vptd-Upsqib-Hyalur Ac 930-920-47-2 mg cap Take 1 Tab by mouth daily.  MULTIVITAMIN WITH MINERALS (MULTIPLE VITAMINS 55 PLUS PO) Take 1 Tab by mouth daily.  rizatriptan (MAXALT) 10 mg tablet Take 1 Tab by mouth once as needed. May repeat in 2 hours if needed (Patient taking differently: Take 1 Tab by mouth once as needed for Migraine. May repeat in 2 hours if needed) 9 Tab 5  ACCU-CHEK KALYAN PLUS TEST STRP strip USE TO CHECK BLOOD SUGAR DAILY 100 Strip 2  
 fluticasone (FLONASE) 50 mcg/actuation nasal spray 2 Sprays by Both Nostrils route daily. (Patient taking differently: 1 Spray by Both Nostrils route daily. ) 1 Bottle 11 Lab Results Component Value Date/Time Hemoglobin A1c 6.7 (H) 06/20/2018 08:42 AM  
Hemoglobin A1c 6.7 (H) 03/16/2018 12:23 PM  
Hemoglobin A1c 7.4 (H) 05/28/2015 09:37 AM  
Hemoglobin A1c, External 7.1 04/22/2016 Hemoglobin A1c, External 6.8 09/11/2015 Glucose 137 (H) 06/20/2018 08:42 AM  
Glucose (POC) 153 (H) 04/17/2018 12:10 PM  
Microalb/Creat ratio (ug/mg creat.) 11.3 03/16/2018 12:23 PM  
LDL, calculated 86 06/20/2018 08:42 AM  
Creatinine 0.75 06/20/2018 08:42 AM  
  
Lab Results Component Value Date/Time Cholesterol, total 184 06/20/2018 08:42 AM  
HDL Cholesterol 83 06/20/2018 08:42 AM  
LDL, calculated 86 06/20/2018 08:42 AM  
LDL-C, External 88 09/11/2015 Triglyceride 76 06/20/2018 08:42 AM  
 
Lab Results Component Value Date/Time ALT (SGPT) 15 06/20/2018 08:42 AM  
AST (SGOT) 17 06/20/2018 08:42 AM  
Alk. phosphatase 75 06/20/2018 08:42 AM  
Bilirubin, total 0.3 06/20/2018 08:42 AM  
Albumin 4.5 06/20/2018 08:42 AM  
Protein, total 6.6 06/20/2018 08:42 AM  
INR 0.9 03/22/2018 11:55 AM  
Prothrombin time 9.7 03/22/2018 11:55 AM  
PLATELET 814 30/24/1159 08:42 AM  
 
 
Lab Results Component Value Date/Time GFR est non-AA 85 06/20/2018 08:42 AM  
GFR est AA 98 06/20/2018 08:42 AM  
Creatinine 0.75 06/20/2018 08:42 AM  
BUN 12 06/20/2018 08:42 AM  
Sodium 142 06/20/2018 08:42 AM  
Potassium 4.5 06/20/2018 08:42 AM  
Chloride 104 06/20/2018 08:42 AM  
CO2 24 06/20/2018 08:42 AM  
  
 
Review of Systems Pertinent items are noted in HPI. Objective:  
 
Visit Vitals  /83 (BP 1 Location: Left arm, BP Patient Position: At rest)  Pulse 78  Temp 98.5 °F (36.9 °C) (Oral)  Resp 18  Ht 5' 3\" (1.6 m)  Wt 148 lb 12.8 oz (67.5 kg)  SpO2 96%  BMI 26.36 kg/m2 Appearance: alert, well appearing, and in no distress and oriented to person, place, and time. Exam: NECK: supple, no lad, no bruit, no tm LUNGS: cta bilat CV rrr, no m/g/r ABD: soft, nt, nd, nabs EXT: no c/c/e Assessment/Plan:  
 
diabetes well controlled. Diabetic issues reviewed with her: discussed improved DM diet. Check labs Continue same meds Migraine headaches - controlled, cotn same Fatigue - ? HALIMA.  TSH and CBC wnl in June. Referred for sleep studies. Overweight - discussed diet and exercise for weight loss Will  Receive flu shot through work. Orders Placed This Encounter  METABOLIC PANEL, BASIC  
 HEMOGLOBIN A1C WITH EAG  
 REFERRAL TO SLEEP STUDIES Follow-up Disposition: 
Return in about 3 months (around 1/1/2019) for diabetes.

## 2018-10-01 NOTE — MR AVS SNAPSHOT
[address] [place] [address] [address] [place] [address] 
[phone] Patient: Janine Ling MRN: [id number] CCB:[birth year] Visit Information Date & Time Provider Department Dept. Phone Encounter #  
 10/1/2018 12:20 Bam Grider MD [facility] [phone] [id number] Follow-up Instructions Return in about 3 months (around 1/1/2019) for diabetes. Upcoming Health Maintenance Date Due Shingrix Vaccine Age 50> (1 of 2) 6/2/2005 Influenza Age 5 to Adult 8/1/2018 DTaP/Tdap/Td series (2 - Td) 11/5/2018 EYE EXAM RETINAL OR DILATED Q1 11/29/2018 HEMOGLOBIN A1C Q6M 12/20/2018 MICROALBUMIN Q1 3/16/2019 LIPID PANEL Q1 6/20/2019 FOOT EXAM Q1 6/26/2019 BREAST CANCER SCRN MAMMOGRAM 12/15/2019 PAP AKA CERVICAL CYTOLOGY 10/12/2020 COLONOSCOPY 7/8/2021 Allergies as of 10/1/2018  Review Complete On: 10/1/2018 By: Humza Ferrera MD  
  
 Severity Noted Reaction Type Reactions Codeine  12/19/2011    Unknown (comments) difficulty breathing Current Immunizations  Reviewed on 2/14/2017 Name Date Influenza Vaccine 11/1/2016, 11/5/2014, 11/29/2013 Influenza Vaccine Whole 10/24/2011, 11/1/2008 TDAP Vaccine 11/5/2008 ZZZ-RETIRED (DO NOT USE) Pneumococcal Vaccine (Unspecified Type) 8/17/2006 Zoster Vaccine, Live 5/13/2016 Not reviewed this visit You Were Diagnosed With   
  
 Codes Comments Type 2 diabetes mellitus without complication, without long-term current use of insulin (HCC)    -  Primary ICD-10-CM: E11.9 ICD-9-CM: 250.00 Chronic fatigue     ICD-10-CM: R53.82 
ICD-9-CM: 780.79 Snoring     ICD-10-CM: R06.83 
ICD-9-CM: 786.09 Migraine without aura and without status migrainosus, not intractable     ICD-10-CM: O33.635 ICD-9-CM: 346.10 Vitals BP Pulse Temp Resp Height(growth percentile) Weight(growth percentile) 125/83 (BP 1 Location: Left arm, BP Patient Position: At rest) 78 98.5 °F (36.9 °C) (Oral) 18 5' 3\" (1.6 m) 148 lb 12.8 oz (67.5 kg) SpO2 BMI OB Status Smoking Status 96% 26.36 kg/m2 Postmenopausal Former Smoker BMI and BSA Data Body Mass Index Body Surface Area  
 26.36 kg/m 2 1.73 m 2 Preferred Pharmacy Pharmacy Name Phone [facility] [address] , [address] [place] [address] [phone] Your Updated Medication List  
  
   
This list is accurate as of 10/1/18  1:01 PM.  Always use your most recent med list.  
  
  
  
  
 ACCU-CHEK KALYAN PLUS TEST STRP strip Generic drug:  glucose blood VI test strips USE TO CHECK BLOOD SUGAR DAILY  
  
 acetaminophen 500 mg tablet Commonly known as:  TYLENOL Take 500 mg by mouth every six (6) hours as needed for Pain. aspirin delayed-release 81 mg tablet Take  by mouth daily. fluticasone 50 mcg/actuation nasal spray Commonly known as:  Nasrin Magallanes 2 Sprays by Both Nostrils route daily. Yzhbxzaq-Gicm-Kfhqsn-Hyalur Ac 512-105-68-2 mg Cap Take 1 Tab by mouth daily. metFORMIN 500 mg tablet Commonly known as:  GLUCOPHAGE Take 1,000 mg by mouth two (2) times a day. MULTIPLE VITAMINS 55 PLUS PO Take 1 Tab by mouth daily. rizatriptan 10 mg tablet Commonly known as:  Padmini Mate Take 1 Tab by mouth once as needed. May repeat in 2 hours if needed We Performed the Following HEMOGLOBIN A1C WITH EAG [69029 CPT(R)] METABOLIC PANEL, BASIC [52372 CPT(R)] REFERRAL TO SLEEP STUDIES [REF99 Custom] Follow-up Instructions Return in about 3 months (around 1/1/2019) for diabetes. Referral Information Referral ID Referred By Referred To  
  
 [id number] ERIC Gonzales S Jeff Knowles MD   
   [facility] [address] [place] [address] Ave Phone: [phone] Fax: [phone] Visits Status Start Date End Date 1 New Request 10/1/18 10/1/19 If your referral has a status of pending review or denied, additional information will be sent to support the outcome of this decision. Please provide this summary of care documentation to your next provider. Your primary care clinician is listed as ERIC LUO. If you have any questions after today's visit, please call [phone].

## 2018-10-08 ENCOUNTER — TELEPHONE (OUTPATIENT)
Dept: SLEEP MEDICINE | Age: 63
End: 2018-10-08

## 2018-10-16 LAB
BUN SERPL-MCNC: 13 MG/DL (ref 8–27)
BUN/CREAT SERPL: 17 (ref 12–28)
CALCIUM SERPL-MCNC: 9.5 MG/DL (ref 8.7–10.3)
CHLORIDE SERPL-SCNC: 103 MMOL/L (ref 96–106)
CO2 SERPL-SCNC: 24 MMOL/L (ref 20–29)
CREAT SERPL-MCNC: 0.77 MG/DL (ref 0.57–1)
EST. AVERAGE GLUCOSE BLD GHB EST-MCNC: 157 MG/DL
GLUCOSE SERPL-MCNC: 93 MG/DL (ref 65–99)
HBA1C MFR BLD: 7.1 % (ref 4.8–5.6)
POTASSIUM SERPL-SCNC: 4.2 MMOL/L (ref 3.5–5.2)
SODIUM SERPL-SCNC: 142 MMOL/L (ref 134–144)

## 2018-10-25 RX ORDER — METFORMIN HYDROCHLORIDE 500 MG/1
1000 TABLET, EXTENDED RELEASE ORAL 2 TIMES DAILY
Qty: 360 TAB | Refills: 3 | Status: SHIPPED | OUTPATIENT
Start: 2018-10-25 | End: 2018-10-26 | Stop reason: SDUPTHER

## 2018-10-25 NOTE — TELEPHONE ENCOUNTER
Returned call and spoke with pharmacy tech at [address] The reason for their call was to inquire whether or not the patient's latest script for Metformin 1000mg bid is for the Extended Release or regular. Per [facility], the patient has been previously taking the Extended Release. Did the order change from Extended to Regular? Please advised.

## 2018-10-25 NOTE — TELEPHONE ENCOUNTER
Pt is calling she states that [facility] will not fill her Metformin until they get a call from provider in reference to a clinical question,[facility] # [phone] pt will run out of medication and would like to send a #30 day supply to her local [facility] on file, pt cell

## 2018-10-26 RX ORDER — METFORMIN HYDROCHLORIDE 500 MG/1
1000 TABLET, EXTENDED RELEASE ORAL 2 TIMES DAILY
Qty: 30 TAB | Refills: 0 | Status: SHIPPED | OUTPATIENT
Start: 2018-10-26 | End: 2019-10-18 | Stop reason: SDUPTHER

## 2018-10-26 NOTE — TELEPHONE ENCOUNTER
Pt left a message on yesterday in regards to Metformin, pt also requested a 30 day supply be sent to her local [facility]. on file because she will run out before she receives her mail order, please contact patient when this is done

## 2018-10-26 NOTE — TELEPHONE ENCOUNTER
Called [facility] and confirmed new script is active:  Metformin 500mg XR Tabs. Take 2 tabs by mouth two times daily.

## 2018-12-17 ENCOUNTER — HOSPITAL ENCOUNTER (OUTPATIENT)
Dept: MAMMOGRAPHY | Age: 63
Discharge: HOME | End: 2018-12-17
Attending: OBSTETRICS & GYNECOLOGY
Payer: COMMERCIAL

## 2018-12-17 DIAGNOSIS — Z12.39 SCREENING BREAST EXAMINATION: ICD-10-CM

## 2018-12-17 PROCEDURE — 77067 SCR MAMMO BI INCL CAD: CPT

## 2019-01-11 ENCOUNTER — OFFICE VISIT (OUTPATIENT)
Dept: INTERNAL MEDICINE CLINIC | Age: 64
End: 2019-01-11

## 2019-01-11 VITALS
RESPIRATION RATE: 14 BRPM | OXYGEN SATURATION: 99 % | HEIGHT: 63 IN | BODY MASS INDEX: 26.4 KG/M2 | WEIGHT: 149 LBS | TEMPERATURE: 98.3 F | DIASTOLIC BLOOD PRESSURE: 77 MMHG | SYSTOLIC BLOOD PRESSURE: 135 MMHG | HEART RATE: 80 BPM

## 2019-01-11 DIAGNOSIS — E11.9 TYPE 2 DIABETES MELLITUS WITHOUT COMPLICATION, WITHOUT LONG-TERM CURRENT USE OF INSULIN (HCC): Primary | ICD-10-CM

## 2019-01-11 NOTE — PROGRESS NOTES
Claudell Romano is a 61 y.o. female Chief Complaint Patient presents with  Follow-up  Diabetes Last A1c 7.1% 1. Have you been to the ER, urgent care clinic since your last visit? Hospitalized since your last visit? No 
 
2. Have you seen or consulted any other health care providers outside of the [address] [place] [address] since your last visit? Include any pap smears or colon screening.  No

## 2019-01-11 NOTE — PROGRESS NOTES
Subjective:  
 
Caleb Robbins is a 61 y.o. female seen for follow up of diabetes. She also has hypertension and hyperlipidemia. Diabetic Review of Systems - medication compliance: compliant all of the time, diabetic diet compliance: compliant most of the time but could improve, home glucose monitoring: is performed regularly, checking 2-3 times a day. 30 day average ws 131. AMs are worse 110-160s. Trying to have snack at night - tends to eat frozen yogurt, Further diabetic ROS: no polyuria or polydipsia, no chest pain, dyspnea or TIA's, no numbness, tingling or pain in extremities, no unusual visual symptoms, no hypoglycemia, last eye exam approximately 1 month ago. Other symptoms and concerns:  
Having weird sensation in head - pulsating posterior head. Still with right sided neck pain as well. .increased stress. Some increase in diarrhea. Current Outpatient Medications Medication Sig Dispense Refill  metFORMIN ER (GLUCOPHAGE XR) 500 mg tablet Take 2 Tabs by mouth two (2) times a day. 30 Tab 0  
 aspirin delayed-release 81 mg tablet Take  by mouth daily.  acetaminophen (TYLENOL) 500 mg tablet Take 500 mg by mouth every six (6) hours as needed for Pain.  MULTIVITAMIN WITH MINERALS (MULTIPLE VITAMINS 55 PLUS PO) Take 1 Tab by mouth daily.  rizatriptan (MAXALT) 10 mg tablet Take 1 Tab by mouth once as needed. May repeat in 2 hours if needed (Patient taking differently: Take 1 Tab by mouth once as needed for Migraine. May repeat in 2 hours if needed) 9 Tab 5  ACCU-CHEK KALYAN PLUS TEST STRP strip USE TO CHECK BLOOD SUGAR DAILY 100 Strip 2  
 fluticasone (FLONASE) 50 mcg/actuation nasal spray 2 Sprays by Both Nostrils route daily. (Patient taking differently: 1 Spray by Both Nostrils route daily. ) 1 Bottle 11 Lab Results Component Value Date/Time  Hemoglobin A1c 7.1 (H) 10/15/2018 02:37 PM  
 Hemoglobin A1c 6.7 (H) 06/20/2018 08:42 AM  
 Hemoglobin A1c 6.7 (H) 03/16/2018 12:23 PM  
 Hemoglobin A1c, External 7.1 04/22/2016 Hemoglobin A1c, External 6.8 09/11/2015 Glucose 93 10/15/2018 02:37 PM  
 Glucose (POC) 153 (H) 04/17/2018 12:10 PM  
 Microalb/Creat ratio (ug/mg creat.) 11.3 03/16/2018 12:23 PM  
 LDL, calculated 86 06/20/2018 08:42 AM  
 Creatinine 0.77 10/15/2018 02:37 PM  
  
Lab Results Component Value Date/Time Cholesterol, total 184 06/20/2018 08:42 AM  
 HDL Cholesterol 83 06/20/2018 08:42 AM  
 LDL, calculated 86 06/20/2018 08:42 AM  
 LDL-C, External 88 09/11/2015 Triglyceride 76 06/20/2018 08:42 AM  
 
Lab Results Component Value Date/Time ALT (SGPT) 15 06/20/2018 08:42 AM  
 AST (SGOT) 17 06/20/2018 08:42 AM  
 Alk. phosphatase 75 06/20/2018 08:42 AM  
 Bilirubin, total 0.3 06/20/2018 08:42 AM  
 Albumin 4.5 06/20/2018 08:42 AM  
 Protein, total 6.6 06/20/2018 08:42 AM  
 INR 0.9 03/22/2018 11:55 AM  
 Prothrombin time 9.7 03/22/2018 11:55 AM  
 PLATELET 281 86/99/6906 08:42 AM  
 
Lab Results Component Value Date/Time GFR est non-AA 82 10/15/2018 02:37 PM  
 GFR est AA 95 10/15/2018 02:37 PM  
 Creatinine 0.77 10/15/2018 02:37 PM  
 BUN 13 10/15/2018 02:37 PM  
 Sodium 142 10/15/2018 02:37 PM  
 Potassium 4.2 10/15/2018 02:37 PM  
 Chloride 103 10/15/2018 02:37 PM  
 CO2 24 10/15/2018 02:37 PM  
  
 
Review of Systems Pertinent items are noted in HPI. Objective:  
 
Visit Vitals /77 (BP 1 Location: Left arm, BP Patient Position: Sitting) Pulse 80 Temp 98.3 °F (36.8 °C) (Oral) Resp 14 Ht 5' 3\" (1.6 m) Wt 149 lb (67.6 kg) SpO2 99% BMI 26.39 kg/m² Appearance: alert, well appearing, and in no distress and oriented to person, place, and time. Exam: NECK: supple, no lad, no bruit, no tm LUNGS: cta bilat CV rrr, no m/g/r ABD: soft, nt, nd, nabs EXT: no c/c/e Assessment/Plan:  
 
diabetes borderline controlled. Diabetic issues reviewed with her: discussed better eating habits. Change metformin dosing to 4 tablets with supper to see if helps with the diarrhea. Orders Placed This Encounter  METABOLIC PANEL, COMPREHENSIVE  
 HEMOGLOBIN A1C WITH EAG  
 glucose blood VI test strips (ACCU-CHEK KALYAN PLUS TEST STRP) strip Follow-up Disposition: 
Return in about 3 months (around 4/11/2019).

## 2019-01-12 LAB
ALBUMIN SERPL-MCNC: 4.6 G/DL (ref 3.6–4.8)
ALBUMIN/GLOB SERPL: 2 {RATIO} (ref 1.2–2.2)
ALP SERPL-CCNC: 77 IU/L (ref 39–117)
ALT SERPL-CCNC: 12 IU/L (ref 0–32)
AST SERPL-CCNC: 7 IU/L (ref 0–40)
BILIRUB SERPL-MCNC: 0.3 MG/DL (ref 0–1.2)
BUN SERPL-MCNC: 13 MG/DL (ref 8–27)
BUN/CREAT SERPL: 21 (ref 12–28)
CALCIUM SERPL-MCNC: 10.1 MG/DL (ref 8.7–10.3)
CHLORIDE SERPL-SCNC: 99 MMOL/L (ref 96–106)
CO2 SERPL-SCNC: 27 MMOL/L (ref 20–29)
CREAT SERPL-MCNC: 0.63 MG/DL (ref 0.57–1)
EST. AVERAGE GLUCOSE BLD GHB EST-MCNC: 151 MG/DL
GLOBULIN SER CALC-MCNC: 2.3 G/DL (ref 1.5–4.5)
GLUCOSE SERPL-MCNC: 171 MG/DL (ref 65–99)
HBA1C MFR BLD: 6.9 % (ref 4.8–5.6)
POTASSIUM SERPL-SCNC: 4 MMOL/L (ref 3.5–5.2)
PROT SERPL-MCNC: 6.9 G/DL (ref 6–8.5)
SODIUM SERPL-SCNC: 141 MMOL/L (ref 134–144)

## 2019-04-11 DIAGNOSIS — E11.9 TYPE 2 DIABETES MELLITUS WITHOUT COMPLICATION, WITHOUT LONG-TERM CURRENT USE OF INSULIN (HCC): ICD-10-CM

## 2019-05-31 ENCOUNTER — OFFICE VISIT (OUTPATIENT)
Dept: INTERNAL MEDICINE CLINIC | Age: 64
End: 2019-05-31

## 2019-05-31 VITALS
DIASTOLIC BLOOD PRESSURE: 73 MMHG | WEIGHT: 151 LBS | TEMPERATURE: 98.6 F | SYSTOLIC BLOOD PRESSURE: 123 MMHG | BODY MASS INDEX: 26.75 KG/M2 | HEIGHT: 63 IN | HEART RATE: 82 BPM | OXYGEN SATURATION: 98 %

## 2019-05-31 DIAGNOSIS — G44.229 CHRONIC TENSION-TYPE HEADACHE, NOT INTRACTABLE: ICD-10-CM

## 2019-05-31 DIAGNOSIS — E66.3 OVERWEIGHT (BMI 25.0-29.9): ICD-10-CM

## 2019-05-31 DIAGNOSIS — E11.9 TYPE 2 DIABETES MELLITUS WITHOUT COMPLICATION, WITHOUT LONG-TERM CURRENT USE OF INSULIN (HCC): Primary | ICD-10-CM

## 2019-05-31 NOTE — PROGRESS NOTES
Subjective:     Opal Guerrero is a 61 y.o. female seen for follow up of diabetes. She also has none  Diabetic Review of Systems - medication compliance: compliant all of the time, diabetic diet compliance: compliant most of the time, home glucose monitoring: is performed regularly, 30 day average is 137 but has seen levels 160-190s for fasting. Further diabetic ROS: no polyuria or polydipsia, no chest pain, dyspnea or TIA's, no numbness, tingling or pain in extremities, no unusual visual symptoms, last eye exam approximately 7 months ago. Other symptoms and concerns:   Headaches have increased in frequency. Awakes every am with a headache located posterior head with some spasms throughout the day. Tries to stretch and move neck. Will take Tylenol if not resolved by 10am.    Increased irritability for the last 2-3 months. Some issues sleeping - some nights cannot shut down her brain. Feels anxious. Not depressed but sometimes just doesn't care. Work can be stressful. Trying to stretch her hip and some bike riding for approx 10 minutes. Occ walking. Diarrhea has improved after cutting back on dairy. Mild chest pain but blames on reflux    Current Outpatient Medications   Medication Sig Dispense Refill    glucose blood VI test strips (ACCU-CHEK KALYAN PLUS TEST STRP) strip Check blood sugar 2-3 times daily E11.9 250 Strip 3    metFORMIN ER (GLUCOPHAGE XR) 500 mg tablet Take 2 Tabs by mouth two (2) times a day. 30 Tab 0    aspirin delayed-release 81 mg tablet Take  by mouth daily.  acetaminophen (TYLENOL) 500 mg tablet Take 500 mg by mouth every six (6) hours as needed for Pain.  MULTIVITAMIN WITH MINERALS (MULTIPLE VITAMINS 55 PLUS PO) Take 1 Tab by mouth daily.  rizatriptan (MAXALT) 10 mg tablet Take 1 Tab by mouth once as needed. May repeat in 2 hours if needed (Patient taking differently: Take 1 Tab by mouth once as needed for Migraine.  May repeat in 2 hours if needed) 9 Tab 5  fluticasone (FLONASE) 50 mcg/actuation nasal spray 2 Sprays by Both Nostrils route daily. (Patient taking differently: 1 Spray by Both Nostrils route daily. ) 1 Bottle 11        Lab Results   Component Value Date/Time    Hemoglobin A1c 6.9 (H) 01/11/2019 12:00 AM    Hemoglobin A1c 7.1 (H) 10/15/2018 02:37 PM    Hemoglobin A1c 6.7 (H) 06/20/2018 08:42 AM    Hemoglobin A1c, External 7.1 04/22/2016    Hemoglobin A1c, External 6.8 09/11/2015    Glucose 171 (H) 01/11/2019 12:00 AM    Glucose (POC) 153 (H) 04/17/2018 12:10 PM    Microalb/Creat ratio (ug/mg creat.) 11.3 03/16/2018 12:23 PM    LDL, calculated 86 06/20/2018 08:42 AM    Creatinine 0.63 01/11/2019 12:00 AM      Lab Results   Component Value Date/Time    Cholesterol, total 184 06/20/2018 08:42 AM    HDL Cholesterol 83 06/20/2018 08:42 AM    LDL, calculated 86 06/20/2018 08:42 AM    LDL-C, External 88 09/11/2015    Triglyceride 76 06/20/2018 08:42 AM     Lab Results   Component Value Date/Time    ALT (SGPT) 12 01/11/2019 12:00 AM    AST (SGOT) 7 01/11/2019 12:00 AM    Alk. phosphatase 77 01/11/2019 12:00 AM    Bilirubin, total 0.3 01/11/2019 12:00 AM    Albumin 4.6 01/11/2019 12:00 AM    Protein, total 6.9 01/11/2019 12:00 AM    INR 0.9 03/22/2018 11:55 AM    Prothrombin time 9.7 03/22/2018 11:55 AM    PLATELET 185 53/57/7926 08:42 AM     Lab Results   Component Value Date/Time    GFR est non-AA 96 01/11/2019 12:00 AM    GFR est  01/11/2019 12:00 AM    Creatinine 0.63 01/11/2019 12:00 AM    BUN 13 01/11/2019 12:00 AM    Sodium 141 01/11/2019 12:00 AM    Potassium 4.0 01/11/2019 12:00 AM    Chloride 99 01/11/2019 12:00 AM    CO2 27 01/11/2019 12:00 AM        Review of Systems  Pertinent items are noted in HPI.     Objective:     Visit Vitals  /73   Pulse 82   Temp 98.6 °F (37 °C)   Ht 5' 3\" (1.6 m)   Wt 151 lb (68.5 kg)   SpO2 98%   BMI 26.75 kg/m²     Appearance: alert, well appearing, and in no distress and oriented to person, place, and time.  Exam: feet: warm, good capillary refill, no trophic changes or ulcerative lesions, normal DP and PT pulses, normal monofilament exam and normal sensory exam  .    Assessment/Plan:     diabetes borderline controlled. Diabetic issues reviewed with her: discussed improved diet  Check labs  Foot exam completed today  Continue same medications. .     Tension headaches - change pillow. Daily stretching exercises    Irritability - discussed mild anxiety/depression symptoms. Encouraged counseling through employer, regular exercise, yoga. Overweight - continue to work on diet and exercise through weight loss    Orders Placed This Encounter    METABOLIC PANEL, COMPREHENSIVE    LIPID PANEL    MICROALBUMIN, UR, RAND W/ MICROALB/CREAT RATIO    HEMOGLOBIN A1C WITH EAG     Follow-up and Dispositions    · Return in about 3 months (around 8/31/2019) for diabetes.

## 2019-05-31 NOTE — PROGRESS NOTES
Chief Complaint   Patient presents with    Follow-up     diabetes, hypertension, hyperlipidemia     Visit Vitals  /73   Pulse 82   Temp 98.6 °F (37 °C)   Ht 5' 3\" (1.6 m)   Wt 151 lb (68.5 kg)   SpO2 98%   BMI 26.75 kg/m²     1. Have you been to the ER, urgent care clinic since your last visit? Hospitalized since your last visit? no    2. Have you seen or consulted any other health care providers outside of the [address] [place] [address] since your last visit? Include any pap smears or colon screening.  no

## 2019-06-08 LAB
ALBUMIN SERPL-MCNC: 4.5 G/DL (ref 3.6–4.8)
ALBUMIN/CREAT UR: 6.8 MG/G CREAT (ref 0–30)
ALBUMIN/GLOB SERPL: 2.3 {RATIO} (ref 1.2–2.2)
ALP SERPL-CCNC: 81 IU/L (ref 39–117)
ALT SERPL-CCNC: 12 IU/L (ref 0–32)
AST SERPL-CCNC: 17 IU/L (ref 0–40)
BILIRUB SERPL-MCNC: 0.2 MG/DL (ref 0–1.2)
BUN SERPL-MCNC: 14 MG/DL (ref 8–27)
BUN/CREAT SERPL: 22 (ref 12–28)
CALCIUM SERPL-MCNC: 10 MG/DL (ref 8.7–10.3)
CHLORIDE SERPL-SCNC: 104 MMOL/L (ref 96–106)
CHOLEST SERPL-MCNC: 173 MG/DL (ref 100–199)
CO2 SERPL-SCNC: 25 MMOL/L (ref 20–29)
CREAT SERPL-MCNC: 0.64 MG/DL (ref 0.57–1)
CREAT UR-MCNC: 168.6 MG/DL
EST. AVERAGE GLUCOSE BLD GHB EST-MCNC: 160 MG/DL
GLOBULIN SER CALC-MCNC: 2 G/DL (ref 1.5–4.5)
GLUCOSE SERPL-MCNC: 147 MG/DL (ref 65–99)
HBA1C MFR BLD: 7.2 % (ref 4.8–5.6)
HDLC SERPL-MCNC: 83 MG/DL
INTERPRETATION, 910389: NORMAL
LDLC SERPL CALC-MCNC: 76 MG/DL (ref 0–99)
Lab: NORMAL
MICROALBUMIN UR-MCNC: 11.4 UG/ML
POTASSIUM SERPL-SCNC: 4.4 MMOL/L (ref 3.5–5.2)
PROT SERPL-MCNC: 6.5 G/DL (ref 6–8.5)
SODIUM SERPL-SCNC: 141 MMOL/L (ref 134–144)
TRIGL SERPL-MCNC: 70 MG/DL (ref 0–149)
VLDLC SERPL CALC-MCNC: 14 MG/DL (ref 5–40)

## 2019-08-09 ENCOUNTER — OFFICE VISIT (OUTPATIENT)
Dept: INTERNAL MEDICINE CLINIC | Age: 64
End: 2019-08-09

## 2019-08-09 VITALS
WEIGHT: 151 LBS | DIASTOLIC BLOOD PRESSURE: 81 MMHG | OXYGEN SATURATION: 97 % | HEIGHT: 63 IN | HEART RATE: 77 BPM | BODY MASS INDEX: 26.75 KG/M2 | TEMPERATURE: 98.8 F | SYSTOLIC BLOOD PRESSURE: 134 MMHG

## 2019-08-09 DIAGNOSIS — R09.81 SINUS CONGESTION: ICD-10-CM

## 2019-08-09 DIAGNOSIS — R53.83 FATIGUE, UNSPECIFIED TYPE: Primary | ICD-10-CM

## 2019-08-09 DIAGNOSIS — G43.009 MIGRAINE WITHOUT AURA AND WITHOUT STATUS MIGRAINOSUS, NOT INTRACTABLE: ICD-10-CM

## 2019-08-09 DIAGNOSIS — E11.9 TYPE 2 DIABETES MELLITUS WITHOUT COMPLICATION, WITHOUT LONG-TERM CURRENT USE OF INSULIN (HCC): ICD-10-CM

## 2019-08-09 RX ORDER — RIZATRIPTAN BENZOATE 10 MG/1
10 TABLET ORAL
Qty: 9 TAB | Refills: 11 | Status: SHIPPED | OUTPATIENT
Start: 2019-08-09 | End: 2019-08-09

## 2019-08-09 NOTE — PROGRESS NOTES
Chief Complaint   Patient presents with    Fatigue     on and off feelings of coldness, weakness in hands, achy, chest pains  x couple months     Visit Vitals  /81   Pulse 77   Temp 98.8 °F (37.1 °C) (Oral)   Ht 5' 3\" (1.6 m)   Wt 151 lb (68.5 kg)   SpO2 97%   BMI 26.75 kg/m²     1. Have you been to the ER, urgent care clinic since your last visit? Hospitalized since your last visit? no    2. Have you seen or consulted any other health care providers outside of the [address] [place] [address] since your last visit? Include any pap smears or colon screening.  no

## 2019-08-09 NOTE — PROGRESS NOTES
HISTORY OF PRESENT ILLNESS  Charles Hernandez is a 59 y.o. female. Increasing fatigue. Walking up tired. Now has noticed she is cold more often than not in the last couple of months.  strength has decreased and body has more achy in the last couple of weeks. Now having intermittent chest pains and tightness in her neck. Headaches on the left side are more often. Saw eye doctor 2 weeks ago for pain shooting through left eye but told everything was fine except for dry eyes. Pain was worse when she bent forward but better when sitting up.  headaches improve with the Maxalt. DM - blood sugars are ok. Last 30 day average was 139 - mostly in am but occ later in the day. Denies changes in her thirst or urination, vision changes (pain as above), numbness in feet. Taking her metformin regularly. Current Outpatient Medications:     glucose blood VI test strips (ACCU-CHEK KALYAN PLUS TEST STRP) strip, Check blood sugar 2-3 times daily E11.9, Disp: 250 Strip, Rfl: 3    metFORMIN ER (GLUCOPHAGE XR) 500 mg tablet, Take 2 Tabs by mouth two (2) times a day., Disp: 30 Tab, Rfl: 0    acetaminophen (TYLENOL) 500 mg tablet, Take 500 mg by mouth every six (6) hours as needed for Pain., Disp: , Rfl:     MULTIVITAMIN WITH MINERALS (MULTIPLE VITAMINS 55 PLUS PO), Take 1 Tab by mouth daily. , Disp: , Rfl:     rizatriptan (MAXALT) 10 mg tablet, Take 1 Tab by mouth once as needed. May repeat in 2 hours if needed (Patient taking differently: Take 1 Tab by mouth once as needed for Migraine. May repeat in 2 hours if needed), Disp: 9 Tab, Rfl: 5    fluticasone (FLONASE) 50 mcg/actuation nasal spray, 2 Sprays by Both Nostrils route daily.  (Patient taking differently: 1 Spray by Both Nostrils route daily.), Disp: 1 Bottle, Rfl: 11    Visit Vitals  /81   Pulse 77   Temp 98.8 °F (37.1 °C) (Oral)   Ht 5' 3\" (1.6 m)   Wt 151 lb (68.5 kg)   SpO2 97%   BMI 26.75 kg/m²       ROS  See above  Physical Exam   Constitutional: She appears well-developed and well-nourished. HENT:   Head: Normocephalic and atraumatic. Right Ear: External ear normal.   Left Ear: External ear normal.   Nares - edematous with thick discharge  Sinuses nontender  OP - mild post nasal drainage   Neck: Neck supple. No thyromegaly present. Cspine, paraspinal muscles and trapezius muscles are nontender. Full rom in neck   Cardiovascular: Normal rate, regular rhythm, normal heart sounds and intact distal pulses. Exam reveals no gallop. No murmur heard. Pulmonary/Chest: Effort normal.   Musculoskeletal: She exhibits no edema. Hands - nml appearance, 2+ radial pulses. nml  strength.   nml DTR bilat upper ext. nml strength bilat UE. Lymphadenopathy:     She has no cervical adenopathy. Vitals reviewed. ASSESSMENT and PLAN  fatigue- with coldness, ? Hypothyroid or anema. Check labs. If no cause found and symptoms continue consider checking sleep study. Left sided headaches - ? Migraines vs sinus with nasal congestion. Continue Maxalt prn. Add Mucinex and Claritin to her flonase. DM - well controlled, cont same.       Orders Placed This Encounter    CBC W/O DIFF    TSH 3RD GENERATION    rizatriptan (MAXALT) 10 mg tablet

## 2019-08-16 LAB
ERYTHROCYTE [DISTWIDTH] IN BLOOD BY AUTOMATED COUNT: 14.7 % (ref 12.3–15.4)
HCT VFR BLD AUTO: 37.3 % (ref 34–46.6)
HGB BLD-MCNC: 12 G/DL (ref 11.1–15.9)
MCH RBC QN AUTO: 28.8 PG (ref 26.6–33)
MCHC RBC AUTO-ENTMCNC: 32.2 G/DL (ref 31.5–35.7)
MCV RBC AUTO: 90 FL (ref 79–97)
PLATELET # BLD AUTO: 334 X10E3/UL (ref 150–450)
RBC # BLD AUTO: 4.16 X10E6/UL (ref 3.77–5.28)
TSH SERPL DL<=0.005 MIU/L-ACNC: 1.63 UIU/ML (ref 0.45–4.5)
WBC # BLD AUTO: 6.4 X10E3/UL (ref 3.4–10.8)

## 2019-10-18 RX ORDER — METFORMIN HYDROCHLORIDE 500 MG/1
TABLET, EXTENDED RELEASE ORAL
Qty: 360 TAB | Refills: 3 | Status: SHIPPED | OUTPATIENT
Start: 2019-10-18

## 2019-10-25 ENCOUNTER — OFFICE VISIT (OUTPATIENT)
Dept: INTERNAL MEDICINE CLINIC | Age: 64
End: 2019-10-25

## 2019-10-25 VITALS
TEMPERATURE: 99.2 F | BODY MASS INDEX: 26.72 KG/M2 | HEIGHT: 63 IN | SYSTOLIC BLOOD PRESSURE: 120 MMHG | OXYGEN SATURATION: 99 % | WEIGHT: 150.8 LBS | HEART RATE: 66 BPM | DIASTOLIC BLOOD PRESSURE: 75 MMHG

## 2019-10-25 DIAGNOSIS — E11.9 TYPE 2 DIABETES MELLITUS WITHOUT COMPLICATION, WITHOUT LONG-TERM CURRENT USE OF INSULIN (HCC): Primary | ICD-10-CM

## 2019-10-25 DIAGNOSIS — G44.229 CHRONIC TENSION-TYPE HEADACHE, NOT INTRACTABLE: ICD-10-CM

## 2019-10-25 RX ORDER — RIZATRIPTAN BENZOATE 10 MG/1
TABLET ORAL
Refills: 11 | COMMUNITY
Start: 2019-10-05 | End: 2020-08-27

## 2019-10-25 NOTE — PROGRESS NOTES
Subjective:     Renan Winston is a 59 y.o. female seen for follow up of diabetes. She also has none. Diabetic Review of Systems - medication compliance: compliant all of the time, diabetic diet compliance: compliant most of the time, home glucose monitorin day average 131, checking in am, further diabetic ROS: no polyuria or polydipsia, no chest pain, dyspnea or TIA's, no numbness, tingling or pain in extremities, no unusual visual symptoms, last eye exam approximately 1 year ago. Other symptoms and concerns:   Intermittent dizziness approx once a week. Fleeting. Tension headaches - continues. Last severe headache was a couple of weekends ago and lasted 3 days. Did not take anything. Sleep issues - continues to struggle with sleep. Audiobook at night tends to help. .    Current Outpatient Medications   Medication Sig Dispense Refill    rizatriptan (MAXALT) 10 mg tablet TAKE 1 TABLET BY MOUTH ONCE AS NEEDED FOR MIGRAINE FOR UP 10 1 DOSE. MAY REPEAT IN 2HRS IF NEEDED. 11    metFORMIN ER (GLUCOPHAGE XR) 500 mg tablet TAKE 2 TABLETS TWICE A  Tab 3    glucose blood VI test strips (ACCU-CHEK KALYAN PLUS TEST STRP) strip Check blood sugar 2-3 times daily E11.9 250 Strip 3    acetaminophen (TYLENOL) 500 mg tablet Take 500 mg by mouth every six (6) hours as needed for Pain.  MULTIVITAMIN WITH MINERALS (MULTIPLE VITAMINS 55 PLUS PO) Take 1 Tab by mouth daily.  fluticasone (FLONASE) 50 mcg/actuation nasal spray 2 Sprays by Both Nostrils route daily. (Patient taking differently: 1 Spray by Both Nostrils route daily. ) 1 Bottle 11        Lab Results   Component Value Date/Time    Hemoglobin A1c 7.2 (H) 2019 08:11 AM    Hemoglobin A1c 6.9 (H) 2019 12:00 AM    Hemoglobin A1c 7.1 (H) 10/15/2018 02:37 PM    Hemoglobin A1c, External 7.1 2016    Hemoglobin A1c, External 6.8 2015    Glucose 147 (H) 2019 08:11 AM    Glucose (POC) 153 (H) 2018 12:10 PM Microalb/Creat ratio (ug/mg creat.) 6.8 06/07/2019 08:11 AM    LDL, calculated 76 06/07/2019 08:11 AM    Creatinine 0.64 06/07/2019 08:11 AM      Lab Results   Component Value Date/Time    Cholesterol, total 173 06/07/2019 08:11 AM    HDL Cholesterol 83 06/07/2019 08:11 AM    LDL, calculated 76 06/07/2019 08:11 AM    LDL-C, External 88 09/11/2015    Triglyceride 70 06/07/2019 08:11 AM     Lab Results   Component Value Date/Time    ALT (SGPT) 12 06/07/2019 08:11 AM    AST (SGOT) 17 06/07/2019 08:11 AM    Alk. phosphatase 81 06/07/2019 08:11 AM    Bilirubin, total 0.2 06/07/2019 08:11 AM    Albumin 4.5 06/07/2019 08:11 AM    Protein, total 6.5 06/07/2019 08:11 AM    INR 0.9 03/22/2018 11:55 AM    Prothrombin time 9.7 03/22/2018 11:55 AM    PLATELET 924 38/67/0615 12:45 PM     Lab Results   Component Value Date/Time    GFR est non-AA 95 06/07/2019 08:11 AM    GFR est  06/07/2019 08:11 AM    Creatinine 0.64 06/07/2019 08:11 AM    BUN 14 06/07/2019 08:11 AM    Sodium 141 06/07/2019 08:11 AM    Potassium 4.4 06/07/2019 08:11 AM    Chloride 104 06/07/2019 08:11 AM    CO2 25 06/07/2019 08:11 AM        Review of Systems  Pertinent items are noted in HPI. Objective:     Visit Vitals  /75   Pulse 66   Temp 99.2 °F (37.3 °C)   Ht 5' 3\" (1.6 m)   Wt 150 lb 12.8 oz (68.4 kg)   SpO2 99%   BMI 26.71 kg/m²     Appearance: alert, well appearing, and in no distress and oriented to person, place, and time. Exam:   NECK: supple, no lad, no bruit, no tm  LUNGS: cta bilat  CV rrr, no m/g/r  ABD: soft, nt, nd, nabs  EXT: no c/c/e    Assessment/Plan:     diabetes well controlled. Diabetic issues reviewed with her: discussed diet adn exercise for improved BS controll. Check labs   Continue same meds    Tension headaches - meds prn. Stretching exercises.      Orders Placed This Encounter    METABOLIC PANEL, COMPREHENSIVE    HEMOGLOBIN A1C WITH EAG    rizatriptan (MAXALT) 10 mg tablet    DISCONTD: aspirin-calcium carbonate 81 mg-300 mg calcium(777 mg) tab     Follow-up and Dispositions    · Return in about 3 months (around 1/25/2020).

## 2019-10-25 NOTE — PATIENT INSTRUCTIONS
Vaccine Information Statement    Influenza (Flu) Vaccine (Inactivated or Recombinant): What You Need to Know    Many Vaccine Information Statements are available in Polish and other languages. See www.immunize.org/vis  Hojas de información sobre vacunas están disponibles en español y en muchos otros idiomas. Visite www.immunize.org/vis    1. Why get vaccinated? Influenza vaccine can prevent influenza (flu). Flu is a contagious disease that spreads around the [facility] every year, usually between October and May. Anyone can get the flu, but it is more dangerous for some people. Infants and young children, people 72years of age and older, pregnant women, and people with certain health conditions or a weakened immune system are at greatest risk of flu complications. Pneumonia, bronchitis, sinus infections and ear infections are examples of flu-related complications. If you have a medical condition, such as heart disease, cancer or diabetes, flu can make it worse. Flu can cause fever and chills, sore throat, muscle aches, fatigue, cough, headache, and runny or stuffy nose. Some people may have vomiting and diarrhea, though this is more common in children than adults. Each year thousands of people in the [facility] die from flu, and many more are hospitalized. Flu vaccine prevents millions of illnesses and flu-related visits to the doctor each year. 2. Influenza vaccines     CDC recommends everyone 10months of age and older get vaccinated every flu season. Children 6 months through 6years of age may need 2 doses during a single flu season. Everyone else needs only 1 dose each flu season. It takes about 2 weeks for protection to develop after vaccination. There are many flu viruses, and they are always changing. Each year a new flu vaccine is made to protect against three or four viruses that are likely to cause disease in the upcoming flu season.  Even when the vaccine doesnt exactly match these viruses, it may still provide some protection. Influenza vaccine does not cause flu. Influenza vaccine may be given at the same time as other vaccines. 3. Talk with your health care provider    Tell your vaccine provider if the person getting the vaccine:   Has had an allergic reaction after a previous dose of influenza vaccine, or has any severe, life-threatening allergies.  Has ever had Guillain-Barré Syndrome (also called GBS). In some cases, your health care provider may decide to postpone influenza vaccination to a future visit. People with minor illnesses, such as a cold, may be vaccinated. People who are moderately or severely ill should usually wait until they recover before getting influenza vaccine. Your health care provider can give you more information. 4. Risks of a reaction     Soreness, redness, and swelling where shot is given, fever, muscle aches, and headache can happen after influenza vaccine.  There may be a very small increased risk of Guillain-Barré Syndrome (GBS) after inactivated influenza vaccine (the flu shot). Perlita Chavez children who get the flu shot along with pneumococcal vaccine (PCV13), and/or DTaP vaccine at the same time might be slightly more likely to have a seizure caused by fever. Tell your health care provider if a child who is getting flu vaccine has ever had a seizure. People sometimes faint after medical procedures, including vaccination. Tell your provider if you feel dizzy or have vision changes or ringing in the ears. As with any medicine, there is a very remote chance of a vaccine causing a severe allergic reaction, other serious injury, or death. 5. What if there is a serious problem? An allergic reaction could occur after the vaccinated person leaves the clinic.  If you see signs of a severe allergic reaction (hives, swelling of the face and throat, difficulty breathing, a fast heartbeat, dizziness, or weakness), call 9-1-1 and get the person to the nearest hospital.    For other signs that concern you, call your health care provider. Adverse reactions should be reported to the Vaccine Adverse Event Reporting System (VAERS). Your health care provider will usually file this report, or you can do it yourself. Visit the VAERS website at www.vaers. hhs.gov or call 7-695.751.2027. VAERS is only for reporting reactions, and VAERS staff do not give medical advice. 6. The National Vaccine Injury Compensation Program    The [facility] Vaccine Injury Compensation Program (VICP) is a federal program that was created to compensate people who may have been injured by certain vaccines. Visit the VICP website at www.hrsa.gov/vaccinecompensation or call 5-664.894.3448 to learn about the program and about filing a claim. There is a time limit to file a claim for compensation. 7. How can I learn more?  Ask your health care provider.  Call your local or state health department.  Contact the Centers for Disease Control and Prevention (CDC):  - Call 7-560.348.2783 (9-798-PUH-INFO) or  - Visit CDCs influenza website at www.cdc.gov/flu    Vaccine Information Statement (Interim)  Inactivated Influenza Vaccine   8/15/2019  [address]inda Sandhoff [id number]   Department of Health and Human Services  Centers for Disease Control and Prevention    Office Use Only         Neck Strain or Sprain: Rehab Exercises  Introduction  Here are some examples of exercises for you to try. The exercises may be suggested for a condition or for rehabilitation. Start each exercise slowly. Ease off the exercises if you start to have pain. You will be told when to start these exercises and which ones will work best for you. How to do the exercises  Neck rotation    1. Sit in a firm chair, or stand up straight. 2. Keeping your chin level, turn your head to the right, and hold for 15 to 30 seconds. 3. Turn your head to the left and hold for 15 to 30 seconds.   4. Repeat 2 to 4 times to each side. Neck stretches    1. Look straight ahead, and tip your right ear to your right shoulder. Do not let your left shoulder rise up as you tip your head to the right. 2. Hold for 15 to 30 seconds. 3. Tilt your head to the left. Do not let your right shoulder rise up as you tip your head to the left. 4. Hold for 15 to 30 seconds. 5. Repeat 2 to 4 times to each side. Forward neck flexion    1. Sit in a firm chair, or stand up straight. 2. Bend your head forward. 3. Hold for 15 to 30 seconds. 4. Repeat 2 to 4 times. Lateral (side) bend strengthening    1. With your right hand, place your first two fingers on your right temple. 2. Start to bend your head to the side while using gentle pressure from your fingers to keep your head from bending. 3. Hold for about 6 seconds. 4. Repeat 8 to 12 times. 5. Switch hands and repeat the same exercise on your left side. Forward bend strengthening    1. Place your first two fingers of either hand on your forehead. 2. Start to bend your head forward while using gentle pressure from your fingers to keep your head from bending. 3. Hold for about 6 seconds. 4. Repeat 8 to 12 times. Neutral position strengthening    1. Using one hand, place your fingertips on the back of your head at the top of your neck. 2. Start to bend your head backward while using gentle pressure from your fingers to keep your head from bending. 3. Hold for about 6 seconds. 4. Repeat 8 to 12 times. Chin tuck    1. Lie on the floor with a rolled-up towel under your neck. Your head should be touching the floor. 2. Slowly bring your chin toward your chest.  3. Hold for a count of 6, and then relax for up to 10 seconds. 4. Repeat 8 to 12 times. Follow-up care is a key part of your treatment and safety. Be sure to make and go to all appointments, and call your doctor if you are having problems.  It's also a good idea to know your test results and keep a list of the medicines you take. Where can you learn more? Go to http://rene-dayan.info/. Enter M679 in the search box to learn more about \"Neck Strain or Sprain: Rehab Exercises. \"  Current as of: June 26, 2019  Content Version: 12.2  © 2561-9784 [employer]. Care instructions adapted under license by [employer] (which disclaims liability or warranty for this information). If you have questions about a medical condition or this instruction, always ask your healthcare professional. [address] any warranty or liability for your use of this information.

## 2019-10-25 NOTE — PROGRESS NOTES
Patient received flu vaccine 10/2019. Chief Complaint   Patient presents with    Follow-up     diabetes     Visit Vitals  /84   Pulse 66   Temp 99.2 °F (37.3 °C)   Ht 5' 3\" (1.6 m)   Wt 150 lb 12.8 oz (68.4 kg)   SpO2 99%   BMI 26.71 kg/m²     1. Have you been to the ER, urgent care clinic since your last visit? Hospitalized since your last visit? no    2. Have you seen or consulted any other health care providers outside of the [address] [place] [address] since your last visit? Include any pap smears or colon screening.  no

## 2019-10-29 LAB
ALBUMIN SERPL-MCNC: 4.6 G/DL (ref 3.6–4.8)
ALBUMIN/GLOB SERPL: 2.1 {RATIO} (ref 1.2–2.2)
ALP SERPL-CCNC: 68 IU/L (ref 39–117)
ALT SERPL-CCNC: 14 IU/L (ref 0–32)
AST SERPL-CCNC: 19 IU/L (ref 0–40)
BILIRUB SERPL-MCNC: 0.5 MG/DL (ref 0–1.2)
BUN SERPL-MCNC: 13 MG/DL (ref 8–27)
BUN/CREAT SERPL: 16 (ref 12–28)
CALCIUM SERPL-MCNC: 9.7 MG/DL (ref 8.7–10.3)
CHLORIDE SERPL-SCNC: 103 MMOL/L (ref 96–106)
CO2 SERPL-SCNC: 25 MMOL/L (ref 20–29)
CREAT SERPL-MCNC: 0.8 MG/DL (ref 0.57–1)
EST. AVERAGE GLUCOSE BLD GHB EST-MCNC: 163 MG/DL
GLOBULIN SER CALC-MCNC: 2.2 G/DL (ref 1.5–4.5)
GLUCOSE SERPL-MCNC: 142 MG/DL (ref 65–99)
HBA1C MFR BLD: 7.3 % (ref 4.8–5.6)
POTASSIUM SERPL-SCNC: 4.1 MMOL/L (ref 3.5–5.2)
PROT SERPL-MCNC: 6.8 G/DL (ref 6–8.5)
SODIUM SERPL-SCNC: 143 MMOL/L (ref 134–144)

## 2019-11-05 ENCOUNTER — TELEPHONE (OUTPATIENT)
Dept: INTERNAL MEDICINE CLINIC | Age: 64
End: 2019-11-05

## 2019-11-05 NOTE — TELEPHONE ENCOUNTER
Called re increase in A1C above goal of < 7%. Discussed starting Jardiance or similar medication but pt declined. Will work hard on diet and exercise and continue her metformin.

## 2020-01-13 ENCOUNTER — TELEPHONE (OUTPATIENT)
Dept: INTERNAL MEDICINE CLINIC | Age: 65
End: 2020-01-13

## 2020-01-13 NOTE — TELEPHONE ENCOUNTER
Caller's first and last name: n/a   Reason for call: Patient requesting fax be sent to her endocrinologist Dr. Pedro Burgos with her last lab work information and appointment notes.    Callback required yes/no and why: yes, to confirm information was sent   Best contact number(s): [phone]   Details to clarify the request: fax [phone], phone [phone] for endocrinologist.       [facility], [place]

## 2020-01-14 NOTE — TELEPHONE ENCOUNTER
Left message for patient stating records have been faxed. Advised patient if her insurance requires referral for appointment to let us know when her appointment is for the referral to be submitted.

## 2020-01-16 ENCOUNTER — OFFICE VISIT (OUTPATIENT)
Dept: INTERNAL MEDICINE CLINIC | Age: 65
End: 2020-01-16

## 2020-01-16 VITALS
TEMPERATURE: 98.7 F | DIASTOLIC BLOOD PRESSURE: 89 MMHG | OXYGEN SATURATION: 98 % | HEIGHT: 63 IN | HEART RATE: 77 BPM | WEIGHT: 149 LBS | SYSTOLIC BLOOD PRESSURE: 135 MMHG | BODY MASS INDEX: 26.4 KG/M2

## 2020-01-16 DIAGNOSIS — G56.02 CARPAL TUNNEL SYNDROME OF LEFT WRIST: ICD-10-CM

## 2020-01-16 DIAGNOSIS — J06.9 VIRAL UPPER RESPIRATORY TRACT INFECTION: Primary | ICD-10-CM

## 2020-01-16 NOTE — PROGRESS NOTES
HISTORY OF PRESENT ILLNESS  Anette Vera is a 59 y.o. female. HPI  Last evening became dizzy walking into [employer]. Had to walk sideways. Lasted a few seconds. She had been walking a while prior to episode occurring. No palpitations, nausea or sweatiness. No reoccurrence. 2 days ago had a shooting pain in left temple and periauricular. She has had this in the past.  Does not lead into a headache. For the last 3 days left arm and hand feels tingling with decreased strength in hand. Mild neck pain. Few weeks ago had a migraine, took the Maxalt and felt dizzy. Current Outpatient Medications:     rizatriptan (MAXALT) 10 mg tablet, TAKE 1 TABLET BY MOUTH ONCE AS NEEDED FOR MIGRAINE FOR UP 10 1 DOSE. MAY REPEAT IN 2HRS IF NEEDED., Disp: , Rfl: 11    metFORMIN ER (GLUCOPHAGE XR) 500 mg tablet, TAKE 2 TABLETS TWICE A DAY, Disp: 360 Tab, Rfl: 3    glucose blood VI test strips (ACCU-CHEK KALYAN PLUS TEST STRP) strip, Check blood sugar 2-3 times daily E11.9, Disp: 250 Strip, Rfl: 3    acetaminophen (TYLENOL) 500 mg tablet, Take 500 mg by mouth every six (6) hours as needed for Pain., Disp: , Rfl:     MULTIVITAMIN WITH MINERALS (MULTIPLE VITAMINS 55 PLUS PO), Take 1 Tab by mouth daily. , Disp: , Rfl:     fluticasone (FLONASE) 50 mcg/actuation nasal spray, 2 Sprays by Both Nostrils route daily. (Patient taking differently: 1 Spray by Both Nostrils route daily.), Disp: 1 Bottle, Rfl: 11    Visit Vitals  /89   Pulse 77   Temp 98.7 °F (37.1 °C) (Oral)   Ht 5' 3\" (1.6 m)   Wt 149 lb (67.6 kg)   SpO2 98%   BMI 26.39 kg/m²       ROS  See above  Physical Exam  Constitutional:       Appearance: Normal appearance. HENT:      Head: Normocephalic and atraumatic. Right Ear: Tympanic membrane, ear canal and external ear normal.      Left Ear: Ear canal and external ear normal.      Ears:      Comments: Fullness left TM without erythema     Nose: Congestion and rhinorrhea (mild clear) present. Comments: sinuses nontender     Mouth/Throat:      Mouth: Mucous membranes are moist.      Pharynx: Oropharynx is clear. Eyes:      Extraocular Movements: Extraocular movements intact. Pupils: Pupils are equal, round, and reactive to light. Comments: No nystagmus with lateral gaze   Neck:      Musculoskeletal: Normal range of motion and neck supple. Comments: Cspine, paraspinal muscles and trazpezius muscles nontender  Cardiovascular:      Rate and Rhythm: Normal rate and regular rhythm. Heart sounds: Normal heart sounds. Pulmonary:      Effort: Pulmonary effort is normal.      Breath sounds: Normal breath sounds. Abdominal:      General: Bowel sounds are normal. There is no distension. Palpations: Abdomen is soft. There is no mass. Tenderness: There is no tenderness. Musculoskeletal:      Comments: bliat wrists with full rom, no thenar wasting.  + phalen's sign on left, negative tinnel's sign bilat. Lymphadenopathy:      Cervical: No cervical adenopathy. Neurological:      General: No focal deficit present. Mental Status: She is alert and oriented to person, place, and time. Cranial Nerves: No cranial nerve deficit. ASSESSMENT and PLAN  URI with headaches, ear pain and dizziness - restart Flonase daily. Add mucinex 1200mg twice a day. Lots of fluids. CTS left - exercises given. If worsens consider wrist splint at night and/or EMG testing. No orders of the defined types were placed in this encounter.

## 2020-01-16 NOTE — PATIENT INSTRUCTIONS
Carpal Tunnel Syndrome: Exercises Introduction Here are some examples of exercises for you to try. The exercises may be suggested for a condition or for rehabilitation. Start each exercise slowly. Ease off the exercises if you start to have pain. You will be told when to start these exercises and which ones will work best for you. Warm-up stretches When you no longer have pain or numbness, you can do exercises to help prevent carpal tunnel syndrome from coming back. Do not do any stretch or movement that is uncomfortable or painful. 1. Rotate your wrist up, down, and from side to side. Repeat 4 times. 2. Stretch your fingers far apart. Relax them, and then stretch them again. Repeat 4 times. 3. Stretch your thumb by pulling it back gently, holding it, and then releasing it. Repeat 4 times. How to do the exercises Prayer stretch 1. Start with your palms together in front of your chest just below your chin. 2. Slowly lower your hands toward your waistline, keeping your hands close to your stomach and your palms together until you feel a mild to moderate stretch under your forearms. 3. Hold for at least 15 to 30 seconds. Repeat 2 to 4 times. Wrist flexor stretch 1. Extend your arm in front of you with your palm up. 2. Bend your wrist, pointing your hand toward the floor. 3. With your other hand, gently bend your wrist farther until you feel a mild to moderate stretch in your forearm. 4. Hold for at least 15 to 30 seconds. Repeat 2 to 4 times. Wrist extensor stretch 1. Repeat steps 1 through 4 of the stretch above, but begin with your extended hand palm down. Follow-up care is a key part of your treatment and safety. Be sure to make and go to all appointments, and call your doctor if you are having problems. It's also a good idea to know your test results and keep a list of the medicines you take. Where can you learn more? Go to http://rene-dayan.info/. Enter W969 in the search box to learn more about \"Carpal Tunnel Syndrome: Exercises. \" Current as of: June 26, 2019 Content Version: 12.2 © 2478-2970 [employer]. Care instructions adapted under license by [employer] (which disclaims liability or warranty for this information). If you have questions about a medical condition or this instruction, always ask your healthcare professional. [address] any warranty or liability for your use of this information. Take Flonase daily. Add Mucinex 1200mg twice a day.

## 2020-01-27 ENCOUNTER — HOSPITAL ENCOUNTER (OUTPATIENT)
Dept: MAMMOGRAPHY | Age: 65
Discharge: HOME | End: 2020-01-27
Attending: OBSTETRICS & GYNECOLOGY
Payer: COMMERCIAL

## 2020-01-27 DIAGNOSIS — Z12.31 VISIT FOR SCREENING MAMMOGRAM: ICD-10-CM

## 2020-01-27 PROCEDURE — 77063 BREAST TOMOSYNTHESIS BI: CPT

## 2020-07-28 ENCOUNTER — VIRTUAL VISIT (OUTPATIENT)
Dept: INTERNAL MEDICINE CLINIC | Age: 65
End: 2020-07-28

## 2020-07-28 DIAGNOSIS — K21.9 GASTROESOPHAGEAL REFLUX DISEASE, ESOPHAGITIS PRESENCE NOT SPECIFIED: ICD-10-CM

## 2020-07-28 DIAGNOSIS — R07.81 RIB PAIN ON LEFT SIDE: Primary | ICD-10-CM

## 2020-07-28 DIAGNOSIS — E11.9 TYPE 2 DIABETES MELLITUS WITHOUT COMPLICATION, WITHOUT LONG-TERM CURRENT USE OF INSULIN (HCC): ICD-10-CM

## 2020-07-28 NOTE — PROGRESS NOTES
Latricia Espinal, who was evaluated through a synchronous (real-time) audio-video encounter, and/or her healthcare decision maker, is aware that it is a billable service, with coverage as determined by her insurance carrier. She provided verbal consent to proceed: Yes, and patient identification was verified. It was conducted pursuant to the emergency declaration under the [address] [place] [address] and the [employer] Act. A caregiver was present when appropriate. Ability to conduct physical exam was limited. I was at home. The patient was at home. Latricia Espinal is a 72 y.o. female being evaluated by a Virtual Visit (video visit) encounter to address concerns as mentioned above. A caregiver was present when appropriate. Due to this being a TeleHealth encounter (During YAA-17 public health emergency), evaluation of the following organ systems was limited: Vitals/Constitutional/EENT/Resp/CV/GI//MS/Neuro/Skin/Heme-Lymph-Imm. Pursuant to the emergency declaration under the [address] [place] [address] and the [employer] Act, this Virtual Visit was conducted with patient's (and/or legal guardian's) consent, to reduce the risk of exposure to COVID-19 and provide necessary medical care. Services were provided through a video synchronous discussion virtually to substitute for in-person encounter. --Antoni Chamberlain MD on 7/28/2020 at 11:33 AM    An electronic signature was used to authenticate this note. Subjective:     Latricia Espinal is a 72 y.o. female seen chest pain. Other symptoms and concerns:   Yesterday started to notice pain on left side under breast while exercising (walking). Area is not tender to touch but felt was swollen. Unchanged today. Some indigestion yesterday with mild nausea x 1.   Took pepto with improvement. She has had more heart burn over the last week. Using applecider vineager and some water which helped. Pepcid chewables as well. Continued enlargement of right proximal clavicular head. Had a VV with Dr. Dinora Granado last week for her DM. This am BS was 177 but 30 day average was 145. Labs from the visit are pending. Current Outpatient Medications   Medication Sig Dispense Refill    glucose blood VI test strips (Accu-Chek Mirtha Plus test strp) strip Check blood sugar 2-3 times daily E11.9 250 Strip 3    rizatriptan (MAXALT) 10 mg tablet TAKE 1 TABLET BY MOUTH ONCE AS NEEDED FOR MIGRAINE FOR UP 10 1 DOSE. MAY REPEAT IN 2HRS IF NEEDED. 11    metFORMIN ER (GLUCOPHAGE XR) 500 mg tablet TAKE 2 TABLETS TWICE A  Tab 3    acetaminophen (TYLENOL) 500 mg tablet Take 500 mg by mouth every six (6) hours as needed for Pain.  MULTIVITAMIN WITH MINERALS (MULTIPLE VITAMINS 55 PLUS PO) Take 1 Tab by mouth daily.  fluticasone (FLONASE) 50 mcg/actuation nasal spray 2 Sprays by Both Nostrils route daily. (Patient taking differently: 1 Spray by Both Nostrils route daily. ) 1 Bottle 11        Lab Results   Component Value Date/Time    Hemoglobin A1c 7.3 (H) 10/28/2019 10:35 AM    Hemoglobin A1c 7.2 (H) 06/07/2019 08:11 AM    Hemoglobin A1c 6.9 (H) 01/11/2019 12:00 AM    Hemoglobin A1c, External 7.1 04/22/2016    Hemoglobin A1c, External 6.8 09/11/2015    Glucose 142 (H) 10/28/2019 10:35 AM    Glucose (POC) 153 (H) 04/17/2018 12:10 PM    Microalb/Creat ratio (ug/mg creat.) 6.8 06/07/2019 08:11 AM    LDL, calculated 76 06/07/2019 08:11 AM    Creatinine 0.80 10/28/2019 10:35 AM      Lab Results   Component Value Date/Time    Cholesterol, total 173 06/07/2019 08:11 AM    HDL Cholesterol 83 06/07/2019 08:11 AM    LDL, calculated 76 06/07/2019 08:11 AM    LDL-C, External 88 09/11/2015    Triglyceride 70 06/07/2019 08:11 AM     Lab Results   Component Value Date/Time    ALT (SGPT) 14 10/28/2019 10:35 AM    Alk. phosphatase 68 10/28/2019 10:35 AM    Bilirubin, total 0.5 10/28/2019 10:35 AM    Albumin 4.6 10/28/2019 10:35 AM    Protein, total 6.8 10/28/2019 10:35 AM    INR 0.9 03/22/2018 11:55 AM    Prothrombin time 9.7 03/22/2018 11:55 AM    PLATELET 697 45/17/5941 12:45 PM     Lab Results   Component Value Date/Time    GFR est non-AA 78 10/28/2019 10:35 AM    GFR est AA 90 10/28/2019 10:35 AM    Creatinine 0.80 10/28/2019 10:35 AM    BUN 13 10/28/2019 10:35 AM    Sodium 143 10/28/2019 10:35 AM    Potassium 4.1 10/28/2019 10:35 AM    Chloride 103 10/28/2019 10:35 AM    CO2 25 10/28/2019 10:35 AM        Review of Systems  Pertinent items are noted in HPI. Objective: There were no vitals taken for this visit. Appearance: alert, well appearing, and in no distress and oriented to person, place, and time. Exam:   NECK - nml appearance  PULM - nml rate and effort. Enlargement of right clavicular head. Area of concern is below left breast overlying her lower anterior 9th and 10th ribs. No visible swelling. per patient nontender  ABD - per patient nontender, no masses, soft      Assessment/Plan:     diabetes reasonably well controlled. Diabetic issues reviewed with her: continue DM diet and exercise  F/u labs with Dr. Norma Carrero same meds    GERD - start Pepcid 20mg daily x 2 weeks. Adjust diet  Call if no improvement. Rib wall pain - gentle stretching, ice  Check rib films.          Orders Placed This Encounter    XR RIBS LT UNI 2 V

## 2020-07-28 NOTE — PROGRESS NOTES
Chief Complaint   Patient presents with    Other     pt denies SOB, chest pain and chest tightness   Pt questions of \"indigestion\"  \"bone feels bigger \" on LEFT SIDE     1. Have you been to the ER, urgent care clinic since your last visit? Hospitalized since your last visit? No    2. Have you seen or consulted any other health care providers outside of the [address] [place] [address] since your last visit? Include any pap smears or colon screening.  No

## 2020-07-29 ENCOUNTER — HOSPITAL ENCOUNTER (OUTPATIENT)
Dept: GENERAL RADIOLOGY | Age: 65
Discharge: HOME | End: 2020-07-29
Payer: COMMERCIAL

## 2020-07-29 DIAGNOSIS — R07.81 RIB PAIN ON LEFT SIDE: ICD-10-CM

## 2020-07-29 PROCEDURE — 71101 X-RAY EXAM UNILAT RIBS/CHEST: CPT

## 2020-08-27 RX ORDER — RIZATRIPTAN BENZOATE 10 MG/1
TABLET ORAL
Qty: 9 TAB | Refills: 11 | Status: SHIPPED | OUTPATIENT
Start: 2020-08-27 | End: 2021-09-09

## 2020-10-06 ENCOUNTER — DOCUMENTATION ONLY (OUTPATIENT)
Dept: INTERNAL MEDICINE CLINIC | Age: 65
End: 2020-10-06

## 2020-10-12 NOTE — PROGRESS NOTES
HISTORY OF PRESENT ILLNESS  Darvin Morrow is a 72 y.o. female. HPI  Here for continued left rib pain. Seen in July for this issues, Xrays in August were normal.  Describes as a twinge, 1/10 on pain scale. Lasts only a couple of seconds. Tends to notice more when walking but also can occur when just sitting. Reaching above, twisting side to side does not cause pain. No pain with deep breaths, coughing or sneezing. No pain associated with eating or BM. Mukesh Micaela GERD - Took 14 days of Prilosec x 3 but reflux continues to return. Saw mucous in stool vs undigested food. Having pain in right neck and shoulder, seeing PT. Those exercises does not bother left chest.    Recent GYN was normal.    DM - Blood sugars are \"ok\" - good days and bad days. 30 day average was 135. UTD with Dr. Martín Doshi. Current Outpatient Medications:     rizatriptan (MAXALT) 10 mg tablet, TAKE 1 TABLET BY MOUTH ONCE AS NEEDED FOR MIGRAINE FOR UP 10 1 DOSE. MAY REPEAT IN 2HRS IF NEEDED., Disp: 9 Tab, Rfl: 11    glucose blood VI test strips (Accu-Chek Mirtha Plus test strp) strip, Check blood sugar 2-3 times daily E11.9, Disp: 250 Strip, Rfl: 3    metFORMIN ER (GLUCOPHAGE XR) 500 mg tablet, TAKE 2 TABLETS TWICE A DAY, Disp: 360 Tab, Rfl: 3    acetaminophen (TYLENOL) 500 mg tablet, Take 500 mg by mouth every six (6) hours as needed for Pain., Disp: , Rfl:     MULTIVITAMIN WITH MINERALS (MULTIPLE VITAMINS 55 PLUS PO), Take 1 Tab by mouth daily. , Disp: , Rfl:     fluticasone (FLONASE) 50 mcg/actuation nasal spray, 2 Sprays by Both Nostrils route daily. (Patient taking differently: 1 Spray by Both Nostrils route daily.), Disp: 1 Bottle, Rfl: 11    Visit Vitals  /86   Pulse 69   Temp (!) 95 °F (35 °C) (Temporal)   Resp 16   Wt 150 lb 6.4 oz (68.2 kg)   SpO2 98%   BMI 26.64 kg/m²       ROS  See above  Physical Exam  Vitals signs reviewed. Constitutional:       Appearance: Normal appearance.    Neck:      Musculoskeletal: Neck supple. Cardiovascular:      Rate and Rhythm: Normal rate and regular rhythm. Pulses: Normal pulses. Heart sounds: Normal heart sounds. Pulmonary:      Effort: Pulmonary effort is normal.      Breath sounds: Normal breath sounds. Comments: Enlargement of left ribs 9/10 on the left. nontender   Abdominal:      General: Abdomen is flat. Bowel sounds are normal. There is no distension. Palpations: Abdomen is soft. Tenderness: There is no abdominal tenderness. Musculoskeletal:      Comments: Spine is straight. No gross sign of scoliosis   Lymphadenopathy:      Cervical: No cervical adenopathy. Neurological:      Mental Status: She is alert. ASSESSMENT and PLAN  Enlargement left lower rib with pain - nontender. Check CT of chest.    GERD - ? Contributing to chest pain as above. Start Protonix.   Referred back to GI  Orders Placed This Encounter    CT CHEST WO CONT    pantoprazole (PROTONIX) 40 mg tablet

## 2020-10-13 ENCOUNTER — OFFICE VISIT (OUTPATIENT)
Dept: INTERNAL MEDICINE CLINIC | Age: 65
End: 2020-10-13
Payer: MEDICARE

## 2020-10-13 VITALS
BODY MASS INDEX: 26.64 KG/M2 | SYSTOLIC BLOOD PRESSURE: 131 MMHG | WEIGHT: 150.4 LBS | TEMPERATURE: 95 F | OXYGEN SATURATION: 98 % | DIASTOLIC BLOOD PRESSURE: 86 MMHG | RESPIRATION RATE: 16 BRPM | HEART RATE: 69 BPM

## 2020-10-13 DIAGNOSIS — R07.81 RIB PAIN ON LEFT SIDE: ICD-10-CM

## 2020-10-13 DIAGNOSIS — K21.9 GASTROESOPHAGEAL REFLUX DISEASE, UNSPECIFIED WHETHER ESOPHAGITIS PRESENT: ICD-10-CM

## 2020-10-13 DIAGNOSIS — R07.9 LEFT-SIDED CHEST PAIN: Primary | ICD-10-CM

## 2020-10-13 PROCEDURE — 1101F PT FALLS ASSESS-DOCD LE1/YR: CPT | Performed by: INTERNAL MEDICINE

## 2020-10-13 PROCEDURE — 99397 PER PM REEVAL EST PAT 65+ YR: CPT | Performed by: INTERNAL MEDICINE

## 2020-10-13 PROCEDURE — G8510 SCR DEP NEG, NO PLAN REQD: HCPCS | Performed by: INTERNAL MEDICINE

## 2020-10-13 PROCEDURE — G9899 SCRN MAM PERF RSLTS DOC: HCPCS | Performed by: INTERNAL MEDICINE

## 2020-10-13 PROCEDURE — G8419 CALC BMI OUT NRM PARAM NOF/U: HCPCS | Performed by: INTERNAL MEDICINE

## 2020-10-13 PROCEDURE — 1090F PRES/ABSN URINE INCON ASSESS: CPT | Performed by: INTERNAL MEDICINE

## 2020-10-13 PROCEDURE — 3017F COLORECTAL CA SCREEN DOC REV: CPT | Performed by: INTERNAL MEDICINE

## 2020-10-13 RX ORDER — PANTOPRAZOLE SODIUM 40 MG/1
40 TABLET, DELAYED RELEASE ORAL DAILY
Qty: 30 TAB | Refills: 1 | Status: SHIPPED | OUTPATIENT
Start: 2020-10-13

## 2020-10-23 ENCOUNTER — HOSPITAL ENCOUNTER (OUTPATIENT)
Dept: CT IMAGING | Age: 65
Discharge: HOME | End: 2020-10-23
Attending: INTERNAL MEDICINE
Payer: COMMERCIAL

## 2020-10-23 DIAGNOSIS — R07.9 LEFT-SIDED CHEST PAIN: ICD-10-CM

## 2020-10-23 DIAGNOSIS — R07.81 RIB PAIN ON LEFT SIDE: ICD-10-CM

## 2020-10-23 PROCEDURE — 71250 CT THORAX DX C-: CPT

## 2020-10-26 ENCOUNTER — TELEPHONE (OUTPATIENT)
Dept: INTERNAL MEDICINE CLINIC | Age: 65
End: 2020-10-26

## 2020-10-26 NOTE — TELEPHONE ENCOUNTER
LM re Chest CT. No cause found for her chest pain. Did find some emphesematous changes. Encouraged her to call back so we can discuss.

## 2020-10-29 ENCOUNTER — TELEPHONE (OUTPATIENT)
Dept: INTERNAL MEDICINE CLINIC | Age: 65
End: 2020-10-29

## 2020-10-30 NOTE — TELEPHONE ENCOUNTER
emphysematous changes on CT. Some cough and can awaken coughing. Previous smoker. Discussed PFTs post pandemic. Reassured re hepatic cysts.

## 2021-01-18 ENCOUNTER — TRANSCRIBE ORDER (OUTPATIENT)
Dept: SCHEDULING | Age: 66
End: 2021-01-18

## 2021-01-18 DIAGNOSIS — K76.89 HEPATIC CYST: ICD-10-CM

## 2021-01-18 DIAGNOSIS — K21.9 GERD (GASTROESOPHAGEAL REFLUX DISEASE): Primary | ICD-10-CM

## 2021-01-18 DIAGNOSIS — E11.9 DM II (DIABETES MELLITUS, TYPE II), CONTROLLED (HCC): ICD-10-CM

## 2021-02-05 ENCOUNTER — HOSPITAL ENCOUNTER (OUTPATIENT)
Dept: ULTRASOUND IMAGING | Age: 66
Discharge: HOME | End: 2021-02-05
Attending: INTERNAL MEDICINE
Payer: COMMERCIAL

## 2021-02-05 DIAGNOSIS — K76.89 HEPATIC CYST: ICD-10-CM

## 2021-02-05 DIAGNOSIS — E11.9 DM II (DIABETES MELLITUS, TYPE II), CONTROLLED (HCC): ICD-10-CM

## 2021-02-05 DIAGNOSIS — K21.9 GERD (GASTROESOPHAGEAL REFLUX DISEASE): ICD-10-CM

## 2021-02-05 PROCEDURE — 76705 ECHO EXAM OF ABDOMEN: CPT

## 2021-02-16 ENCOUNTER — TRANSCRIBE ORDER (OUTPATIENT)
Dept: SCHEDULING | Age: 66
End: 2021-02-16

## 2021-02-16 DIAGNOSIS — Z12.31 SCREENING MAMMOGRAM FOR HIGH-RISK PATIENT: Primary | ICD-10-CM

## 2021-03-04 ENCOUNTER — TRANSCRIBE ORDER (OUTPATIENT)
Dept: REGISTRATION | Age: 66
End: 2021-03-04

## 2021-03-04 ENCOUNTER — HOSPITAL ENCOUNTER (OUTPATIENT)
Dept: GENERAL RADIOLOGY | Age: 66
Discharge: HOME | End: 2021-03-04
Payer: MEDICARE

## 2021-03-04 DIAGNOSIS — Z01.812 PRE-PROCEDURE LAB EXAM: ICD-10-CM

## 2021-03-04 DIAGNOSIS — Z01.812 PRE-PROCEDURE LAB EXAM: Primary | ICD-10-CM

## 2021-03-04 DIAGNOSIS — R06.02 SOB (SHORTNESS OF BREATH): Primary | ICD-10-CM

## 2021-03-04 DIAGNOSIS — R06.02 SOB (SHORTNESS OF BREATH): ICD-10-CM

## 2021-03-04 PROCEDURE — 71046 X-RAY EXAM CHEST 2 VIEWS: CPT

## 2021-03-29 ENCOUNTER — HOSPITAL ENCOUNTER (OUTPATIENT)
Dept: MAMMOGRAPHY | Age: 66
Discharge: HOME | End: 2021-03-29
Attending: OBSTETRICS & GYNECOLOGY
Payer: COMMERCIAL

## 2021-03-29 DIAGNOSIS — Z12.31 SCREENING MAMMOGRAM FOR HIGH-RISK PATIENT: ICD-10-CM

## 2021-03-29 PROCEDURE — 77063 BREAST TOMOSYNTHESIS BI: CPT

## 2021-08-30 LAB — HBA1C MFR BLD HPLC: 7.1 %

## 2021-09-09 RX ORDER — RIZATRIPTAN BENZOATE 10 MG/1
TABLET ORAL
Qty: 9 TABLET | Refills: 11 | Status: SHIPPED | OUTPATIENT
Start: 2021-09-09

## 2022-02-16 LAB — HBA1C MFR BLD HPLC: 6.9 %

## 2022-03-07 ENCOUNTER — HOSPITAL ENCOUNTER (OUTPATIENT)
Dept: PREADMISSION TESTING | Age: 67
Discharge: HOME | End: 2022-03-07
Attending: INTERNAL MEDICINE
Payer: MEDICARE

## 2022-03-07 ENCOUNTER — TRANSCRIBE ORDER (OUTPATIENT)
Dept: REGISTRATION | Age: 67
End: 2022-03-07

## 2022-03-07 DIAGNOSIS — U07.1 COVID-19: Primary | ICD-10-CM

## 2022-03-07 DIAGNOSIS — U07.1 COVID-19: ICD-10-CM

## 2022-03-07 PROCEDURE — U0005 INFEC AGEN DETEC AMPLI PROBE: HCPCS

## 2022-03-08 LAB
SARS-COV-2, XPLCVT: NOT DETECTED
SOURCE, COVRS: NORMAL

## 2022-03-10 ENCOUNTER — ANESTHESIA EVENT (OUTPATIENT)
Dept: ENDOSCOPY | Age: 67
End: 2022-03-10
Payer: MEDICARE

## 2022-03-10 ENCOUNTER — HOSPITAL ENCOUNTER (OUTPATIENT)
Age: 67
Setting detail: OUTPATIENT SURGERY
Discharge: HOME | End: 2022-03-10
Attending: INTERNAL MEDICINE | Admitting: INTERNAL MEDICINE
Payer: MEDICARE

## 2022-03-10 ENCOUNTER — ANESTHESIA (OUTPATIENT)
Dept: ENDOSCOPY | Age: 67
End: 2022-03-10
Payer: MEDICARE

## 2022-03-10 VITALS
DIASTOLIC BLOOD PRESSURE: 75 MMHG | TEMPERATURE: 97.5 F | OXYGEN SATURATION: 100 % | SYSTOLIC BLOOD PRESSURE: 122 MMHG | HEART RATE: 72 BPM | RESPIRATION RATE: 18 BRPM

## 2022-03-10 PROCEDURE — 76040000019: Performed by: INTERNAL MEDICINE

## 2022-03-10 PROCEDURE — 76060000031 HC ANESTHESIA FIRST 0.5 HR: Performed by: INTERNAL MEDICINE

## 2022-03-10 RX ORDER — MIDAZOLAM HYDROCHLORIDE 1 MG/ML
.25-5 INJECTION, SOLUTION INTRAMUSCULAR; INTRAVENOUS
Status: DISCONTINUED | OUTPATIENT
Start: 2022-03-10 | End: 2022-03-10 | Stop reason: HOSPADM

## 2022-03-10 RX ORDER — SODIUM CHLORIDE 0.9 % (FLUSH) 0.9 %
5-40 SYRINGE (ML) INJECTION EVERY 8 HOURS
Status: DISCONTINUED | OUTPATIENT
Start: 2022-03-10 | End: 2022-03-10 | Stop reason: HOSPADM

## 2022-03-10 RX ORDER — ATROPINE SULFATE 0.1 MG/ML
0.5 INJECTION INTRAVENOUS
Status: DISCONTINUED | OUTPATIENT
Start: 2022-03-10 | End: 2022-03-10 | Stop reason: HOSPADM

## 2022-03-10 RX ORDER — SODIUM CHLORIDE 9 MG/ML
INJECTION, SOLUTION INTRAVENOUS
Status: DISCONTINUED | OUTPATIENT
Start: 2022-03-10 | End: 2022-03-10 | Stop reason: HOSPADM

## 2022-03-10 RX ORDER — LIDOCAINE HYDROCHLORIDE 20 MG/ML
INJECTION, SOLUTION INFILTRATION; PERINEURAL AS NEEDED
Status: DISCONTINUED | OUTPATIENT
Start: 2022-03-10 | End: 2022-03-10 | Stop reason: HOSPADM

## 2022-03-10 RX ORDER — DEXTROMETHORPHAN/PSEUDOEPHED 2.5-7.5/.8
1.2 DROPS ORAL
Status: DISCONTINUED | OUTPATIENT
Start: 2022-03-10 | End: 2022-03-10 | Stop reason: HOSPADM

## 2022-03-10 RX ORDER — SODIUM CHLORIDE 0.9 % (FLUSH) 0.9 %
5-40 SYRINGE (ML) INJECTION AS NEEDED
Status: DISCONTINUED | OUTPATIENT
Start: 2022-03-10 | End: 2022-03-10 | Stop reason: HOSPADM

## 2022-03-10 RX ORDER — SODIUM CHLORIDE 9 MG/ML
150 INJECTION, SOLUTION INTRAVENOUS CONTINUOUS
Status: DISCONTINUED | OUTPATIENT
Start: 2022-03-10 | End: 2022-03-10 | Stop reason: HOSPADM

## 2022-03-10 RX ORDER — EPINEPHRINE 0.1 MG/ML
1 INJECTION INTRACARDIAC; INTRAVENOUS
Status: DISCONTINUED | OUTPATIENT
Start: 2022-03-10 | End: 2022-03-10 | Stop reason: HOSPADM

## 2022-03-10 RX ORDER — FENTANYL CITRATE 50 UG/ML
200 INJECTION, SOLUTION INTRAMUSCULAR; INTRAVENOUS
Status: DISCONTINUED | OUTPATIENT
Start: 2022-03-10 | End: 2022-03-10 | Stop reason: HOSPADM

## 2022-03-10 RX ORDER — PROPOFOL 10 MG/ML
INJECTION, EMULSION INTRAVENOUS AS NEEDED
Status: DISCONTINUED | OUTPATIENT
Start: 2022-03-10 | End: 2022-03-10 | Stop reason: HOSPADM

## 2022-03-10 RX ADMIN — PROPOFOL 60 MG: 10 INJECTION, EMULSION INTRAVENOUS at 08:02

## 2022-03-10 RX ADMIN — PROPOFOL 40 MG: 10 INJECTION, EMULSION INTRAVENOUS at 08:05

## 2022-03-10 RX ADMIN — SODIUM CHLORIDE: 9 INJECTION, SOLUTION INTRAVENOUS at 07:44

## 2022-03-10 RX ADMIN — PROPOFOL 25 MG: 10 INJECTION, EMULSION INTRAVENOUS at 08:14

## 2022-03-10 RX ADMIN — PROPOFOL 25 MG: 10 INJECTION, EMULSION INTRAVENOUS at 08:17

## 2022-03-10 RX ADMIN — PROPOFOL 25 MG: 10 INJECTION, EMULSION INTRAVENOUS at 08:10

## 2022-03-10 RX ADMIN — PROPOFOL 25 MG: 10 INJECTION, EMULSION INTRAVENOUS at 08:07

## 2022-03-10 RX ADMIN — LIDOCAINE HYDROCHLORIDE 50 MG: 20 INJECTION, SOLUTION INFILTRATION; PERINEURAL at 08:02

## 2022-03-10 NOTE — H&P
[address], [address] [place] [address]          Pre-procedure History and Physical       NAME:  Lela Mercedes   :   1955   MRN:   [id number]     CHIEF COMPLAINT/HPI: lgib    PMH:  Past Medical History:   Diagnosis Date    Depression     anxiety    Diabetes ([address]     Type II    GERD (gastroesophageal reflux disease)     Headache(784.0)     migraines    Hx of colonic polyps     Menopause     LMP-48years old?  PUD (peptic ulcer disease)        PSH:  Past Surgical History:   Procedure Laterality Date    ENDOSCOPY, COLON, DIAGNOSTIC  3/4/11    ([place])    HX HEENT      T&A    HX HIP REPLACEMENT Left 2018    MO TOTAL HIP ARTHROPLASTY      left       Allergies: Allergies   Allergen Reactions    Codeine Unknown (comments)     difficulty breathing       Home Medications:  Prior to Admission Medications   Prescriptions Last Dose Informant Patient Reported? Taking? MULTIVITAMIN WITH MINERALS (MULTIPLE VITAMINS 55 PLUS PO) 3/3/2022 at Unknown time  Yes Yes   Sig: Take 1 Tab by mouth daily. acetaminophen (TYLENOL) 500 mg tablet 3/3/2022 at Unknown time  Yes Yes   Sig: Take 500 mg by mouth every six (6) hours as needed for Pain. fluticasone (FLONASE) 50 mcg/actuation nasal spray 3/3/2022 at Unknown time  No Yes   Si Sprays by Both Nostrils route daily. Patient taking differently: 1 Spray by Both Nostrils route daily. glucose blood VI test strips (Accu-Chek Mirtha Plus test strp) strip 3/9/2022 at Unknown time  No Yes   Sig: Check blood sugar 2-3 times daily E11.9   metFORMIN ER (GLUCOPHAGE XR) 500 mg tablet 3/8/2022  No No   Sig: TAKE 2 TABLETS TWICE A DAY   pantoprazole (PROTONIX) 40 mg tablet 2/10/2022 at Unknown time  No Yes   Sig: Take 1 Tab by mouth daily. rizatriptan (MAXALT) 10 mg tablet 2/10/2022 at Unknown time  No Yes   Sig: TAKE 1 TABLET BY MOUTH ONCE AS NEEDED FOR MIGRAINE FOR UP 10 1 DOSE. MAY REPEAT IN 2HRS IF NEEDED. Facility-Administered Medications: None       Hospital Medications:  Current Facility-Administered Medications   Medication Dose Route Frequency    0.9% sodium chloride infusion  150 mL/hr IntraVENous CONTINUOUS    sodium chloride (NS) flush 5-40 mL  5-40 mL IntraVENous Q8H    sodium chloride (NS) flush 5-40 mL  5-40 mL IntraVENous PRN    midazolam (VERSED) injection 0.25-5 mg  0.25-5 mg IntraVENous Multiple    fentaNYL citrate (PF) injection 200 mcg  200 mcg IntraVENous Multiple    simethicone (MYLICON) 90HV/3.8VZ oral drops 80 mg  1.2 mL Oral Multiple    atropine injection 0.5 mg  0.5 mg IntraVENous ONCE PRN    EPINEPHrine (ADRENALIN) 0.1 mg/mL syringe 1 mg  1 mg Endoscopically ONCE PRN       Family History:  Family History   Problem Relation Age of Onset    Cancer Father         lung (+tob)    Coronary Art Dis Father     Cancer Mother         vaginal/cervical    SLE Mother     Coronary Art Dis Sister        Social History:  Social History     Tobacco Use    Smoking status: Former Smoker     Packs/day: 20.00     Types: Cigarettes     Quit date: 3/22/1995     Years since quittin.9    Smokeless tobacco: Never Used   Substance Use Topics    Alcohol use: No     Alcohol/week: 0.0 standard drinks       The patient was counseled at length about the risks of norah Covid-19 in the cali-operative and post-operative states including the recovery window of their procedure. The patient was made aware that norah Covid-19 after a surgical procedure may worsen their prognosis for recovering from the virus and lend to a higher morbidity and or mortality risk. The patient was given the options of postponing their procedure. All of the risks, benefits, and alternatives were discussed. The patient does  wish to proceed with the procedure.     PHYSICAL EXAM PRIOR TO SEDATION:  General: Alert, in no acute distress    Lungs:            CTA bilaterally  Heart:  Normal S1, S2    Abdomen: Soft, Non distended, Non tender. Normoactive bowel sounds. Assessment:   Stable for sedation administration.   Date of last colonoscopy: 2016, Polyps  No    Plan:     · Endoscopic procedure with sedation     Signed By: Margarita Banegas MD     3/10/2022  8:02 AM

## 2022-03-10 NOTE — ANESTHESIA PREPROCEDURE EVALUATION
Relevant Problems   No relevant active problems       Anesthetic History   No history of anesthetic complications            Review of Systems / Medical History  Patient summary reviewed, nursing notes reviewed and pertinent labs reviewed    Pulmonary  Within defined limits                 Neuro/Psych   Within defined limits      Psychiatric history     Cardiovascular  Within defined limits                Exercise tolerance: >4 METS     GI/Hepatic/Renal  Within defined limits   GERD      PUD     Endo/Other  Within defined limits  Diabetes    Arthritis     Other Findings              Physical Exam    Airway  Mallampati: II  TM Distance: > 6 cm  Neck ROM: normal range of motion   Mouth opening: Normal     Cardiovascular  Regular rate and rhythm,  S1 and S2 normal,  no murmur, click, rub, or gallop             Dental  No notable dental hx       Pulmonary  Breath sounds clear to auscultation               Abdominal  GI exam deferred       Other Findings            Anesthetic Plan    ASA: 2  Anesthesia type: MAC          Induction: Intravenous  Anesthetic plan and risks discussed with: Patient

## 2022-03-10 NOTE — PROCEDURES
Charisse Villatoro [facility] [address] M.D[address]  [address] [place] [address], [address] [place] [address]  [phone]               Colonoscopy Procedure Note    NAME: Meryl Nowak  :  1955  MRN:  [id number]    Indications:   Change in stool caliber     : Jamal Li MD    Referring Provider:  Aryan Garcia MD    Staff: Endoscopy Technician-1: Sonia Leyva IV  Endoscopy RN-1: Simi Hutson RN    Prosthetic devices, grafts, tissues, transplant, or devices implanted: none    Medicines:  MAC anesthesia      Procedure Details:  After informed consent was obtained with all risks and benefits of the procedure explained and preprocedure exam completed, the patient was placed in the left lateral decubitus position. Universal protocol for patient identification was performed and documented in the nursing notes. Throughout the procedure, the patient's blood pressure was monitored at least every five minutes; pulse, and oxygen saturations were monitored continuously. All vital signs were documented in the nursing notes. A digital rectal exam was performed and was normal.  The Olympus videocolonoscope  was inserted in the rectum and carefully advanced to the cecum, which was identified by the ileocecal valve and appendiceal orifice. The colonoscope was slowly withdrawn with careful evaluation between folds. Retroflexion in the rectum was performed; findings and interventions are described below. Procedure start time, extent reached time/cecum time, and procedure end time are documented in the nursing notes. The quality of preparation was adequate. Findings:   1. Mild ascending and sigmoid colon diverticulosis  2. Large internal hemorrhoids    Interventions:    none    Specimens:   * No specimens in log *    EBL:  None. Complications:   No immediate complications     Impression:  -See post-procedure diagnoses.      Recommendations:   -For colon cancer screening in this average-risk patient, colonoscopy may be repeated in 10 years.   -Will setup for RBL      Signed by:  Lucas Ortega MD          3/10/2022  8:25 AM

## 2022-03-10 NOTE — PERIOP NOTES
.Patient has been evaluated by anesthesia pre-procedure. Patient alert and oriented. Vital signs will not be charted by the Endoscopy nurse. All vitals, airway, and loc are monitored by anesthesia staff throughout procedure.        .Endoscope was pre-cleaned at bedside immediately following procedure by CHARO

## 2022-03-10 NOTE — DISCHARGE INSTRUCTIONS
Charisse Garcia [address] M.D.  [address], [address]   [phone]          COLONOSCOPY [address], [address]  [id number]  1955    DISCOMFORT:  Redness at IV site- apply warm compress to area; if redness or soreness persist- contact your physician  There may be a slight amount of blood passed from the rectum  Gaseous discomfort- walking, belching will help relieve any discomfort  You may not operate a vehicle for 12 hours  You may not engage in an occupation involving machinery or appliances for the  rest of today  You may not drink alcoholic beverages for at least 12 hours  Avoid making any critical decisions for at least 24 hours    DIET:   You may resume your normal diet, but some patients find that heavy or large  meals may lead to indigestion or vomiting. I suggest a light meal as first food  intake. I recommend a whole food, plant-based diet for your overall health. ACTIVITY:  You may resume your normal daily activities. It is recommended that you spend the remainder of the day resting - avoid any strenuous activity. CALL MALLORIE Marley ANY SIGN OF:   Increasing pain, nausea, vomiting  Abdominal distension (swelling)  Significant bleeding (oral or rectal)  Fever   Pain in chest area  Shortness of breath    Additional Instructions:   Call Dr. Lorin Cuellar if any questions or problems at [phone]   Should have a repeat colonoscopy in 10 years. Colonoscopy showed mild diverticulosis and large internal hemorrhoids. My assistant will call you to schedule for hemorrhoid banding. Learning About Coronavirus [phone])  Coronavirus [phone]): Overview  What is coronavirus (COVID-19)? The coronavirus disease (COVID-19) is caused by a virus. It is an illness that was first found in [place], [place], in December 2019. It has since spread worldwide. The virus can cause fever, cough, and trouble breathing.  In severe cases, it can cause pneumonia and make it hard to breathe without help. It can cause death. Coronaviruses are a large group of viruses. They cause the common cold. They also cause more serious illnesses like Middle East respiratory syndrome (MERS) and severe acute respiratory syndrome (SARS). COVID-19 is caused by a novel coronavirus. That means it's a new type that has not been seen in people before. This virus spreads person-to-person through droplets from coughing and sneezing. It can also spread when you are close to someone who is infected. And it can spread when you touch something that has the virus on it, such as a doorknob or a tabletop. What can you do to protect yourself from coronavirus (COVID-19)? The best way to protect yourself from getting sick is to:  · Avoid areas where there is an outbreak. · Avoid contact with people who may be infected. · Wash your hands often with soap or alcohol-based hand sanitizers. · Avoid crowds and try to stay at least 6 feet away from other people. · Wash your hands often, especially after you cough or sneeze. Use soap and water, and scrub for at least 20 seconds. If soap and water aren't available, use an alcohol-based hand . · Avoid touching your mouth, nose, and eyes. What can you do to avoid spreading the virus to others? To help avoid spreading the virus to others:  · Cover your mouth with a tissue when you cough or sneeze. Then throw the tissue in the trash. · Use a disinfectant to clean things that you touch often. · Stay home if you are sick or have been exposed to the virus. Don't go to school, work, or public areas. And don't use public transportation. · If you are sick:  ? Leave your home only if you need to get medical care. But call the doctor's office first so they know you're coming. And wear a face mask, if you have one.  ? If you have a face mask, wear it whenever you're around other people.  It can help stop the spread of the virus when you cough or sneeze. ? Clean and disinfect your home every day. Use household  and disinfectant wipes or sprays. Take special care to clean things that you grab with your hands. These include doorknobs, remote controls, phones, and handles on your refrigerator and microwave. And don't forget countertops, tabletops, bathrooms, and computer keyboards. When to call for help  Call 911 anytime you think you may need emergency care. For example, call if:  · You have severe trouble breathing. (You can't talk at all.)  · You have constant chest pain or pressure. · You are severely dizzy or lightheaded. · You are confused or can't think clearly. · Your face and lips have a blue color. · You pass out (lose consciousness) or are very hard to wake up. Call your doctor now if you develop symptoms such as:  · Shortness of breath. · Fever. · Cough. If you need to get care, call ahead to the doctor's office for instructions before you go. Make sure you wear a face mask, if you have one, to prevent exposing other people to the virus. Where can you get the latest information? The following health organizations are tracking and studying this virus. Their websites contain the most up-to-date information. Sukhdeep Learn also learn what to do if you think you may have been exposed to the virus. · U.S. Centers for Disease Control and Prevention (CDC): The CDC provides updated news about the disease and travel advice. The website also tells you how to prevent the spread of infection. www.cdc.gov  · World Health Organization [facility]): WHO offers information about the virus outbreaks. WHO also has travel advice. www.who.int  Current as of: April 1, 2020               Content Version: 12.4  © 3464-0929 [employer].    Care instructions adapted under license by your healthcare professional. If you have questions about a medical condition or this instruction, always ask your healthcare professional. Hiägen Dina any warranty or liability for your use of this information.

## 2022-03-11 ENCOUNTER — TRANSCRIBE ORDER (OUTPATIENT)
Dept: SCHEDULING | Age: 67
End: 2022-03-11

## 2022-03-11 DIAGNOSIS — Z12.31 VISIT FOR SCREENING MAMMOGRAM: Primary | ICD-10-CM

## 2022-03-18 PROBLEM — M16.12 OSTEOARTHRITIS OF LEFT HIP: Status: ACTIVE | Noted: 2018-04-16

## 2022-04-29 ENCOUNTER — HOSPITAL ENCOUNTER (OUTPATIENT)
Dept: MAMMOGRAPHY | Age: 67
Discharge: HOME | End: 2022-04-29
Payer: MEDICARE

## 2022-04-29 DIAGNOSIS — Z12.31 VISIT FOR SCREENING MAMMOGRAM: ICD-10-CM

## 2022-04-29 PROCEDURE — 77063 BREAST TOMOSYNTHESIS BI: CPT

## 2023-04-23 DIAGNOSIS — Z12.31 VISIT FOR SCREENING MAMMOGRAM: Primary | ICD-10-CM

## 2023-05-01 ENCOUNTER — HOSPITAL ENCOUNTER (OUTPATIENT)
Dept: MAMMOGRAPHY | Age: 68
Discharge: HOME | End: 2023-05-01
Payer: MEDICARE

## 2023-05-01 DIAGNOSIS — Z12.31 VISIT FOR SCREENING MAMMOGRAM: ICD-10-CM

## 2023-05-01 PROCEDURE — 77063 BREAST TOMOSYNTHESIS BI: CPT

## 2023-10-05 ENCOUNTER — TRANSCRIBE ORDERS (OUTPATIENT)
Facility: HOSPITAL | Age: 68
End: 2023-10-05

## 2023-10-05 ENCOUNTER — HOSPITAL ENCOUNTER (OUTPATIENT)
Facility: HOSPITAL | Age: 68
Discharge: HOME | End: 2023-10-05
Payer: MEDICARE

## 2023-10-05 DIAGNOSIS — R06.02 SHORTNESS OF BREATH: Primary | ICD-10-CM

## 2023-10-05 DIAGNOSIS — R06.02 SHORTNESS OF BREATH: ICD-10-CM

## 2023-10-05 PROCEDURE — 71046 X-RAY EXAM CHEST 2 VIEWS: CPT

## 2024-04-10 ENCOUNTER — TRANSCRIBE ORDERS (OUTPATIENT)
Facility: HOSPITAL | Age: 69
End: 2024-04-10

## 2024-04-10 DIAGNOSIS — Z12.31 VISIT FOR SCREENING MAMMOGRAM: Primary | ICD-10-CM

## 2024-05-03 ENCOUNTER — HOSPITAL ENCOUNTER (OUTPATIENT)
Facility: HOSPITAL | Age: 69
End: 2024-05-03
Payer: MEDICARE

## 2024-05-03 VITALS — BODY MASS INDEX: 23.92 KG/M2 | HEIGHT: 63 IN | WEIGHT: 135 LBS

## 2024-05-03 DIAGNOSIS — Z12.31 VISIT FOR SCREENING MAMMOGRAM: ICD-10-CM

## 2024-05-03 PROCEDURE — 77063 BREAST TOMOSYNTHESIS BI: CPT

## 2025-04-24 ENCOUNTER — TRANSCRIBE ORDERS (OUTPATIENT)
Facility: HOSPITAL | Age: 70
End: 2025-04-24

## 2025-04-24 DIAGNOSIS — Z12.31 VISIT FOR SCREENING MAMMOGRAM: Primary | ICD-10-CM

## 2025-05-06 ENCOUNTER — HOSPITAL ENCOUNTER (OUTPATIENT)
Facility: HOSPITAL | Age: 70
Discharge: HOME | End: 2025-05-09
Payer: MEDICARE

## 2025-05-06 VITALS — WEIGHT: 135 LBS | HEIGHT: 63 IN | BODY MASS INDEX: 23.92 KG/M2

## 2025-05-06 DIAGNOSIS — Z12.31 VISIT FOR SCREENING MAMMOGRAM: ICD-10-CM

## 2025-05-06 PROCEDURE — 77063 BREAST TOMOSYNTHESIS BI: CPT

## (undated) DEVICE — SOLUTION IRRIG 3000ML 0.9% SOD CHL FLX CONT 0797208] ICU MEDICAL INC]

## (undated) DEVICE — STERILE POLYISOPRENE POWDER-FREE SURGICAL GLOVES WITH EMOLLIENT COATING: Brand: PROTEXIS

## (undated) DEVICE — DRAPE XR C ARM 41X74IN LF --

## (undated) DEVICE — DEVON™ KNEE AND BODY STRAP 60" X 3" (1.5 M X 7.6 CM): Brand: DEVON

## (undated) DEVICE — SOLUTION IRRIG 1000ML H2O STRL BLT

## (undated) DEVICE — DERMABOND SKIN ADH 0.7ML -- DERMABOND ADVANCED 12/BX

## (undated) DEVICE — COVER,MAYO STAND,STERILE: Brand: MEDLINE

## (undated) DEVICE — INFECTION CONTROL KIT SYS

## (undated) DEVICE — NEEDLE SPNL 22GA L3.5IN BLK HUB S STL REG WALL FIT STYL W/

## (undated) DEVICE — Device

## (undated) DEVICE — 2108 SERIES SAGITTAL BLADE (18.6 X 0.8 X 73.8MM)

## (undated) DEVICE — SUTURE VCRL 0 L27IN ABSRB VLT CT L40MM 1/2 CIR TAPERPOINT J352H

## (undated) DEVICE — SCRUB DRY SURG EZ SCRUB BRUSH PREOPERATIVE GRN

## (undated) DEVICE — DRSG AQUACEL SURG 3.5 X 10IN -- CONVERT TO ITEM 370183

## (undated) DEVICE — SYR LR LCK 1ML GRAD NSAF 30ML --

## (undated) DEVICE — KENDALL SCD EXPRESS SLEEVES, KNEE LENGTH, MEDIUM: Brand: KENDALL SCD

## (undated) DEVICE — SUTURE VCRL SZ 2-0 L36IN ABSRB UD L40MM CT 1/2 CIR J957H

## (undated) DEVICE — BLADE ELECTRODE: Brand: EDGE

## (undated) DEVICE — 4-PORT MANIFOLD: Brand: NEPTUNE 2

## (undated) DEVICE — SUTURE MCRYL SZ 3-0 L27IN ABSRB UD L19MM PS-2 3/8 CIR PRIM Y427H

## (undated) DEVICE — PADDING CAST SPEC 6INX4YD COT --

## (undated) DEVICE — REM POLYHESIVE ADULT PATIENT RETURN ELECTRODE: Brand: VALLEYLAB

## (undated) DEVICE — SUTURE STRATAFIX SYMMETRIC PDS + SZ 1 L18IN ABSRB VLT L48MM SXPP1A400

## (undated) DEVICE — TRAY CATH 16F URIN MTR LTX -- CONVERT TO ITEM 363111

## (undated) DEVICE — SUTURE VCRL SZ 2 L54IN ABSRB UD L65MM TP-1 1/2 CIR J880T

## (undated) DEVICE — 6619 2 PTNT ISO SYS INCISE AREA&LT;(&GT;&&LT;)&GT;P: Brand: STERI-DRAPE™ IOBAN™ 2

## (undated) DEVICE — HANDPIECE SET WITH BONE CLEANING TIP AND SUCTION TUBE: Brand: INTERPULSE

## (undated) DEVICE — (D)PREP SKN CHLRAPRP APPL 26ML -- CONVERT TO ITEM 371833